# Patient Record
Sex: MALE | Race: BLACK OR AFRICAN AMERICAN | Employment: UNEMPLOYED | URBAN - METROPOLITAN AREA
[De-identification: names, ages, dates, MRNs, and addresses within clinical notes are randomized per-mention and may not be internally consistent; named-entity substitution may affect disease eponyms.]

---

## 2019-05-09 ENCOUNTER — HOSPITAL ENCOUNTER (EMERGENCY)
Age: 49
Discharge: HOME | End: 2019-05-09
Attending: EMERGENCY MEDICINE | Admitting: EMERGENCY MEDICINE
Payer: MEDICAID

## 2019-05-09 VITALS
TEMPERATURE: 98.2 F | RESPIRATION RATE: 16 BRPM | DIASTOLIC BLOOD PRESSURE: 91 MMHG | SYSTOLIC BLOOD PRESSURE: 129 MMHG | HEART RATE: 72 BPM | OXYGEN SATURATION: 100 %

## 2019-05-09 DIAGNOSIS — G89.29 CHRONIC RIGHT-SIDED LOW BACK PAIN WITH RIGHT-SIDED SCIATICA: Primary | ICD-10-CM

## 2019-05-09 DIAGNOSIS — M54.41 CHRONIC RIGHT-SIDED LOW BACK PAIN WITH RIGHT-SIDED SCIATICA: Primary | ICD-10-CM

## 2019-05-09 PROCEDURE — 99283 EMERGENCY DEPT VISIT LOW MDM: CPT

## 2019-05-09 PROCEDURE — 96372 THER/PROPH/DIAG INJ SC/IM: CPT

## 2019-05-09 PROCEDURE — 74011250636 HC RX REV CODE- 250/636: Performed by: PHYSICIAN ASSISTANT

## 2019-05-09 RX ORDER — KETOROLAC TROMETHAMINE 10 MG/1
10 TABLET, FILM COATED ORAL
Qty: 20 TAB | Refills: 0 | Status: SHIPPED | OUTPATIENT
Start: 2019-05-09 | End: 2019-05-14

## 2019-05-09 RX ORDER — KETOROLAC TROMETHAMINE 30 MG/ML
15 INJECTION, SOLUTION INTRAMUSCULAR; INTRAVENOUS
Status: COMPLETED | OUTPATIENT
Start: 2019-05-09 | End: 2019-05-09

## 2019-05-09 RX ADMIN — KETOROLAC TROMETHAMINE 15 MG: 30 INJECTION, SOLUTION INTRAMUSCULAR at 11:59

## 2019-05-09 NOTE — ED NOTES
I have reviewed discharge instruction and prescriptions with patient. Patient verbalized understanding and has no further questions at this time. Education taught and patient verbalized understanding of education. Teach back method used. Patients pain decreased at time of discharge. Belongings given to patient. Patient discharged ambulatory to home.

## 2019-05-09 NOTE — ED NOTES
Patient states he is unable to care for himself since having a stroke \"awhile ago\". Patient states he is wearing the same clothing for the last 4 days due to inability to dress/ undress himself or bath himself. Patient also states that he does not have food in his house and is also unable to cook for himself. Patient states he did have in home care by a nurse but no longer. Case management consulted.

## 2019-05-09 NOTE — PROGRESS NOTES
Case Management consult received for \"concerns about home care. \" Call was placed to pt's [facility] , Ricardo Buckner ([phone]) and confirmed that pt is followed by a Dr. Dayton Hendrix, psychiatrist with the [facility]. Marcus Norton confirmed that pt also has a mental health counselor, home health, a personal care aide to assist with ADL's and a  to assist with IADL's. Per Marcus Norton, the [address] with the patient weekly and meals are prepared by pt's personal care aide. Marcus Norton also informed me that pt is \"sometimes difficult to keep up with\" as he is often not home when home health and his aide arrives. Marcus Norton endorsed just seeing patient on Monday, 5/6/19 and stated that he was fine. He informed me that pt's partner is currently hospitalized at [facility] on the behavioral health unit, and informed me that pt has been \"exhibiting attention seeking behavior\" since his partner's hospitalization. Will update pt's provider and nurse. Saen Guallpa RN, BSN, ACM Outcomes Manager 
[phone] (phone)

## 2019-05-09 NOTE — DISCHARGE INSTRUCTIONS
Patient Education        Back Pain: Care Instructions  Your Care Instructions    Back pain has many possible causes. It is often related to problems with muscles and ligaments of the back. It may also be related to problems with the nerves, discs, or bones of the back. Moving, lifting, standing, sitting, or sleeping in an awkward way can strain the back. Sometimes you don't notice the injury until later. Arthritis is another common cause of back pain. Although it may hurt a lot, back pain usually improves on its own within several weeks. Most people recover in 12 weeks or less. Using good home treatment and being careful not to stress your back can help you feel better sooner. Follow-up care is a key part of your treatment and safety. Be sure to make and go to all appointments, and call your doctor if you are having problems. It's also a good idea to know your test results and keep a list of the medicines you take. How can you care for yourself at home? · Sit or lie in positions that are most comfortable and reduce your pain. Try one of these positions when you lie down:  ? Lie on your back with your knees bent and supported by large pillows. ? Lie on the floor with your legs on the seat of a sofa or chair. ? Lie on your side with your knees and hips bent and a pillow between your legs. ? Lie on your stomach if it does not make pain worse. · Do not sit up in bed, and avoid soft couches and twisted positions. Bed rest can help relieve pain at first, but it delays healing. Avoid bed rest after the first day of back pain. · Change positions every 30 minutes. If you must sit for long periods of time, take breaks from sitting. Get up and walk around, or lie in a comfortable position. · Try using a heating pad on a low or medium setting for 15 to 20 minutes every 2 or 3 hours. Try a warm shower in place of one session with the heating pad. · You can also try an ice pack for 10 to 15 minutes every 2 to 3 hours. Put a thin cloth between the ice pack and your skin. · Take pain medicines exactly as directed. ? If the doctor gave you a prescription medicine for pain, take it as prescribed. ? If you are not taking a prescription pain medicine, ask your doctor if you can take an over-the-counter medicine. · Take short walks several times a day. You can start with 5 to 10 minutes, 3 or 4 times a day, and work up to longer walks. Walk on level surfaces and avoid hills and stairs until your back is better. · Return to work and other activities as soon as you can. Continued rest without activity is usually not good for your back. · To prevent future back pain, do exercises to stretch and strengthen your back and stomach. Learn how to use good posture, safe lifting techniques, and proper body mechanics. When should you call for help? Call your doctor now or seek immediate medical care if:    · You have new or worsening numbness in your legs.     · You have new or worsening weakness in your legs. (This could make it hard to stand up.)     · You lose control of your bladder or bowels.    Watch closely for changes in your health, and be sure to contact your doctor if:    · You have a fever, lose weight, or don't feel well.     · You do not get better as expected. Where can you learn more? Go to http://timbo-mason.info/. Enter X416 in the search box to learn more about \"Back Pain: Care Instructions. \"  Current as of: September 20, 2018  Content Version: 11.9  © 1139-2453 [employer]. Care instructions adapted under license by [employer] (which disclaims liability or warranty for this information). If you have questions about a medical condition or this instruction, always ask your healthcare professional. [place] [address] any warranty or liability for your use of this information.          Patient Education        Learning About Relief for Back Pain  What is back tension and strain? Back strain happens when you overstretch, or pull, a muscle in your back. You may hurt your back in an accident or when you exercise or lift something. Most back pain will get better with rest and time. You can take care of yourself at home to help your back heal.  What can you do first to relieve back pain? When you first feel back pain, try these steps:  · Walk. Take a short walk (10 to 20 minutes) on a level surface (no slopes, hills, or stairs) every 2 to 3 hours. Walk only distances you can manage without pain, especially leg pain. · Relax. Find a comfortable position for rest. Some people are comfortable on the floor or a medium-firm bed with a small pillow under their head and another under their knees. Some people prefer to lie on their side with a pillow between their knees. Don't stay in one position for too long. · Try heat or ice. Try using a heating pad on a low or medium setting, or take a warm shower, for 15 to 20 minutes every 2 to 3 hours. Or you can buy single-use heat wraps that last up to 8 hours. You can also try an ice pack for 10 to 15 minutes every 2 to 3 hours. You can use an ice pack or a bag of frozen vegetables wrapped in a thin towel. There is not strong evidence that either heat or ice will help, but you can try them to see if they help. You may also want to try switching between heat and cold. · Take pain medicine exactly as directed. ¨ If the doctor gave you a prescription medicine for pain, take it as prescribed. ¨ If you are not taking a prescription pain medicine, ask your doctor if you can take an over-the-counter medicine. What else can you do? · Stretch and exercise. Exercises that increase flexibility may relieve your pain and make it easier for your muscles to keep your spine in a good, neutral position. And don't forget to keep walking. · Do self-massage.  You can use self-massage to unwind after work or school or to energize yourself in the morning. You can easily massage your feet, hands, or neck. Self-massage works best if you are in comfortable clothes and are sitting or lying in a comfortable position. Use oil or lotion to massage bare skin. · Reduce stress. Back pain can lead to a vicious [place]: Distress about the pain tenses the muscles in your back, which in turn causes more pain. Learn how to relax your mind and your muscles to lower your stress. Where can you learn more? Go to http://timbo-mason.info/. Enter I490 in the search box to learn more about \"Learning About Relief for Back Pain. \"  Current as of: March 21, 2017  Content Version: 11.5  © 9692-2387 [employer]. Care instructions adapted under license by [employer] (which disclaims liability or warranty for this information). If you have questions about a medical condition or this instruction, always ask your healthcare professional. [place] [address] any warranty or liability for your use of this information. Patient Education        Chronic Pain: Care Instructions  Your Care Instructions    Chronic pain is pain that lasts a long time (months or even years) and may or may not have a clear cause. It is different from acute pain, which usually does have a clear cause--like an injury or illness--and gets better over time. Chronic pain:  · Lasts over time but may vary from day to day. · Does not go away despite efforts to end it. · May disrupt your sleep and lead to fatigue. · May cause depression or anxiety. · May make your muscles tense, causing more pain. · Can disrupt your work, hobbies, home life, and relationships with friends and family. Chronic pain is a very real condition. It is not just in your head. Treatment can help and usually includes several methods used together, such as medicines, physical therapy, exercise, and other treatments.  Learning how to relax and changing negative thought patterns can also help you cope. Chronic pain is complex. Taking an active role in your treatment will help you better manage your pain. Tell your doctor if you have trouble dealing with your pain. You may have to try several things before you find what works best for you. Follow-up care is a key part of your treatment and safety. Be sure to make and go to all appointments, and call your doctor if you are having problems. It's also a good idea to know your test results and keep a list of the medicines you take. How can you care for yourself at home? · Pace yourself. Break up large jobs into smaller tasks. Save harder tasks for days when you have less pain, or go back and forth between hard tasks and easier ones. Take rest breaks. · Relax, and reduce stress. Relaxation techniques such as deep breathing or meditation can help. · Keep moving. Gentle, daily exercise can help reduce pain over the long run. Try low- or no-impact exercises such as walking, swimming, and stationary biking. Do stretches to stay flexible. · Try heat, cold packs, and massage. · Get enough sleep. Chronic pain can make you tired and drain your energy. Talk with your doctor if you have trouble sleeping because of pain. · Think positive. Your thoughts can affect your pain level. Do things that you enjoy to distract yourself when you have pain instead of focusing on the pain. See a movie, read a book, listen to music, or spend time with a friend. · If you think you are depressed, talk to your doctor about treatment. · Keep a daily pain diary. Record how your moods, thoughts, sleep patterns, activities, and medicine affect your pain. You may find that your pain is worse during or after certain activities or when you are feeling a certain emotion. Having a record of your pain can help you and your doctor find the best ways to treat your pain. · Take pain medicines exactly as directed.   ? If the doctor gave you a prescription medicine for pain, take it as prescribed. ? If you are not taking a prescription pain medicine, ask your doctor if you can take an over-the-counter medicine. Reducing constipation caused by pain medicine  · Include fruits, vegetables, beans, and whole grains in your diet each day. These foods are high in fiber. · Drink plenty of fluids, enough so that your urine is light yellow or clear like water. If you have kidney, heart, or liver disease and have to limit fluids, talk with your doctor before you increase the amount of fluids you drink. · If your doctor recommends it, get more exercise. Walking is a good choice. Bit by bit, increase the amount you walk every day. Try for at least 30 minutes on most days of the week. · Schedule time each day for a bowel movement. A daily routine may help. Take your time and do not strain when having a bowel movement. When should you call for help? Call your doctor now or seek immediate medical care if:    · Your pain gets worse or is out of control.     · You feel down or blue, or you do not enjoy things like you once did. You may be depressed, which is common in people with chronic pain. Depression can be treated.     · You have vomiting or cramps for more than 2 hours.    Watch closely for changes in your health, and be sure to contact your doctor if:    · You cannot sleep because of pain.     · You are very worried or anxious about your pain.     · You have trouble taking your pain medicine.     · You have any concerns about your pain medicine.     · You have trouble with bowel movements, such as:  ? No bowel movement in 3 days. ? Blood in the anal area, in your stool, or on the toilet paper. ? Diarrhea for more than 24 hours. Where can you learn more? Go to http://timbo-mason.info/. Enter N004 in the search box to learn more about \"Chronic Pain: Care Instructions. \"  Current as of: Cristy 3, 2018  Content Version: 11.9  © 6127-1901 [employer], [facility].  Care instructions adapted under license by [employer] (which disclaims liability or warranty for this information). If you have questions about a medical condition or this instruction, always ask your healthcare professional. [address] any warranty or liability for your use of this information.

## 2019-05-09 NOTE — ED PROVIDER NOTES
EMERGENCY DEPARTMENT HISTORY AND PHYSICAL EXAM 
 
Date: 5/9/2019 Patient Name: Deon Melton History of Presenting Illness Chief Complaint Patient presents with  Back Pain History Provided By: Patient Additional History (Context): Deon Melton is a 51 y.o. male with h/o chronic back pain, HIV, seizure, depression, substance abuse, stroke, heart failure who presents with chronic back pain. Patient states he has had this pain for years and years, gets occasional flareups but is usually seen by [facility] provider. States pain is located in right lower back sometimes shoots down right leg. Denies fever, chills. Denies saddle anesthesia or incontinence. Patient states he lives alone but does not have any care of him. Patient denies any weakness, numbness or tingling or any other concerning symptoms at this time. Patient states pain today is similar to all previous flareups and just needs something to help with the pain. PCP: None Current Outpatient Medications Medication Sig Dispense Refill  ketorolac (TORADOL) 10 mg tablet Take 1 Tab by mouth every six (6) hours as needed for Pain for up to 5 days. 20 Tab 0  
 levETIRAcetam (KEPPRA) 500 mg tablet Take  by mouth two (2) times a day.  lisinopril (PRINIVIL, ZESTRIL) 10 mg tablet Take  by mouth daily.  emtricitabine-tenofovir (TRUVADA) 200-300 mg per tablet Take  by mouth daily.  ritonavir (NORVIR) 100 mg tablet Take  by mouth two (2) times daily (with meals). Past History Past Medical History: 
Past Medical History:  
Diagnosis Date  Autoimmune disease ([address]  Depression  Heart failure ([address]  HIV (human immunodeficiency virus infection) ([facility]  HTN (hypertension)  Seizure ([address]  Seizures ([address]  Stroke ([facility] [address]  Substance abuse Past Surgical History: No past surgical history on file. Family History: 
Family History Problem Relation Age of Onset  Depression Neg Hx  Suicide Neg Hx  Psychotic Disorder Neg Hx  Substance Abuse Neg Hx  Dementia Neg Hx Social History: 
Social History Tobacco Use  Smoking status: Current Every Day Smoker Packs/day: 0.25 Substance Use Topics  Alcohol use: Yes Alcohol/week: 0.5 oz Types: 1 Cans of beer per week  Drug use: Yes Types: Marijuana, Cocaine Allergies: Allergies Allergen Reactions  Penicillins Swelling  Prozac [Fluoxetine] Swelling Review of Systems Review of Systems Constitutional: Negative for chills and fever. HENT: Negative. Eyes: Negative. Respiratory: Negative for cough and shortness of breath. Cardiovascular: Negative for chest pain and palpitations. Gastrointestinal: Negative for abdominal pain, constipation, diarrhea, nausea and vomiting. Genitourinary: Negative. Negative for difficulty urinating and flank pain. Musculoskeletal: Positive for back pain. Negative for gait problem and neck pain. Skin: Negative. Allergic/Immunologic: Negative. Neurological: Negative for dizziness, weakness, numbness and headaches. Psychiatric/Behavioral: Negative. All other systems reviewed and are negative. All Other Systems Negative Physical Exam  
Nursing note and vitals reviewed. CONSTITUTIONAL: Alert, in no apparent distress; well-developed; well-nourished. HEAD:  Normocephalic, atraumatic. RESP: Chest clear, equal breath sounds. CV: S1 and S2 WNL; No murmurs, gallops or rubs. GI: Abdomen soft and non-tender. No masses or organomegaly. BACK: right sided lower back  Muscles in lower back TTP. FROM, 5/5 strength, 2+DTR's,no lumbar paraspinal tenderness no erythema, no edema, no ecchymosis,(-) deformity, swelling, skin changes, midline tenderness or crepitus. (-) step offs. Neg SLR bilaterlly. Full sensation to deep and light palpation bilaterally. (-) foot drop UPPER EXT:  Normal inspection. LOWER EXT: Normal inspection NEURO:  strength 5/5 and sym, sensation intact. SKIN: No rashes; Normal for age and stage. PSYCH:  Alert and oriented, normal affect. Diagnostic Study Results Labs - No results found for this or any previous visit (from the past 12 hour(s)). Radiologic Studies - No orders to display CT Results  (Last 48 hours) None CXR Results  (Last 48 hours) None Medical Decision Making I am the first provider for this patient. I reviewed the vital signs, available nursing notes, past medical history, past surgical history, family history and social history. Vital Signs-Reviewed the patient's vital signs. Pulse Oximetry Analysis - 100% on RA Records Reviewed: Nursing Notes and Old Medical Records Procedures: 
Procedures Provider Notes (Medical Decision Making):  
52 yo M here with lower back pain. Pt presents ambulatory, moving BLE in NAD, well-hydrated, non-toxic in appearance, with normal vitals. Exam including neuro exam normal. No red flags such as saddle paresthesias, weakness, bladder/bowel dysfunction, IVDA, DM, or fever - very low suspicion for epidural abscess, cauda equina, or spinal cord injury. No recent injury/trauma/heavy lifting. Do not feel that any emergent imaging is warranted at this time. Exam and HPI consistent with lumbosacral radiculopathy/sciatica/myofascial strain/sprain. Assistant with patient's chronic pain. Patient had explained to nurse that he had problems at home caring for himself as the pain prevents him from being able to dress or make food. Case management was consulted. She was able to discuss case with his personal  from [place] who disclosed that patient actually has caretaker, physical therapist and assistant who takes patient weekly to get medications . Patient is not alone at home and when asked he agreed to this. Appropriate for discharge home. Progress Note/Consultations: MED RECONCILIATION: 
No current facility-administered medications for this encounter. Current Outpatient Medications Medication Sig  
 ketorolac (TORADOL) 10 mg tablet Take 1 Tab by mouth every six (6) hours as needed for Pain for up to 5 days.  levETIRAcetam (KEPPRA) 500 mg tablet Take  by mouth two (2) times a day.  lisinopril (PRINIVIL, ZESTRIL) 10 mg tablet Take  by mouth daily.  emtricitabine-tenofovir (TRUVADA) 200-300 mg per tablet Take  by mouth daily.  ritonavir (NORVIR) 100 mg tablet Take  by mouth two (2) times daily (with meals). Disposition: 
home DISCHARGE NOTE:  
 
Pt has been reexamined. Patient has no new complaints, changes, or physical findings. Care plan outlined and precautions discussed. Results of work up, treatment plan and disposition were reviewed with the patient. All medications were reviewed with the patient; including any prescriptions given. All of pt's questions and concerns were addressed. Patient was instructed and agrees to follow up with pcp , as well as to return to the ED upon further deterioration. Patient is ready to go home. Follow-up Information Follow up With Specialties Details Why Contact Info [facility] EMERGENCY DEPT Emergency Medicine   [address] 
[phone] [facility]    [address] [place] [address] [address] [place] [address]) [phone] Current Discharge Medication List  
  
START taking these medications Details  
ketorolac (TORADOL) 10 mg tablet Take 1 Tab by mouth every six (6) hours as needed for Pain for up to 5 days. Qty: 20 Tab, Refills: 0 Diagnosis Clinical Impression: back pain, muscle strain

## 2022-04-06 ENCOUNTER — HOSPITAL ENCOUNTER (OUTPATIENT)
Dept: PHYSICAL THERAPY | Age: 52
Discharge: HOME | End: 2022-04-06
Payer: COMMERCIAL

## 2022-04-06 PROCEDURE — 97162 PT EVAL MOD COMPLEX 30 MIN: CPT

## 2022-04-06 PROCEDURE — 97166 OT EVAL MOD COMPLEX 45 MIN: CPT

## 2022-04-06 NOTE — PROGRESS NOTES
[address] [facility] PHYSICAL THERAPY  [address] [facility], [facility], [address] [employer] - Phone: [phone]  Fax: [phone] / [facility]  Patient Name: Vickie Martinez : 1970   Medical   Diagnosis: Gait instability [R26.81]  CVA (cerebral vascular accident) [facility]) [I63.9] Treatment Diagnosis: Gait instability [R26.81]  CVA (cerebral vascular accident) [facility]) [I63.9]      Onset Date: 2019     Referral Source: Rosalva Cunningham MD [facility]): 2022   Prior Hospitalization: See medical history Provider #: [id number]   Prior Level of Function: Independent with ADLs, ambulation prior to CVA   Comorbidities: prior CVA, HTN, HIV, substance abuse, seizures, visual impairment, depression   Medications: Verified on Patient Summary List   The Plan of Care and following information is based on the information from the initial evaluation.   ===========================================================================================  Assessment / key information:  Patient is a 45 y.o. male who presents with complaints of left-sided weakness s/p CVA. Patient demonstrates impaired posture, decreased ROM/strength left UE/LE, decreased balance, decreased activity tolerance and impaired functional mobility/gait. Five Time Sit to Stand Test = 18\", Alexander = 36/56, gait speed as measured by 10-meter walk test = 0.4 m/s (household ambulation) and Functional Gait Assessment = 3/30 (all indicative of increased risk of fall). Patient ambulates with SPC with decreased gait speed and decreased left foot clearance/circumduction to advance left LE. Patient would benefit from skilled PT services to address these issues and improve function.   Thank you for this referral.    FOTO: 72/100  ==========================================================================================  Eval Complexity: History: HIGH Complexity :3+ comorbidities / personal factors will impact the outcome/ POC Exam:HIGH Complexity : 4+ Standardized tests and measures addressing body structure, function, activity limitation and / or participation in recreation  Presentation: MEDIUM Complexity : Evolving with changing characteristics  Clinical Decision Making:MEDIUM Complexity : FOTO score of 26-74Overall Complexity:MEDIUM      Problem List: pain affecting function, decrease ROM, decrease strength, impaired gait/ balance, decrease ADL/ functional abilitiies, decrease activity tolerance, decrease flexibility/ joint mobility and decrease transfer abilities   Treatment Plan may include any combination of the following: Therapeutic exercise, Therapeutic activities, Neuromuscular re-education, Physical agent/modality, Gait/balance training, Manual therapy, Patient education, Self Care training, Functional mobility training, Home safety training and Stair training  Patient / Family readiness to learn indicated by: asking questions, trying to perform skills and interest  Persons(s) to be included in education: patient (P)  Barriers to Learning/Limitations: None  Measures taken:    Patient Goal (s): \"Regain use of my left side. \"   Patient self reported health status: fair  Rehabilitation Potential: good   Short Term Goals: To be accomplished in  2-4  weeks:  1. Patient will demonstrate compliance with HEP. 2. Patient will score greater than or equal to 6/30 on FGA to indicate improved balance/decreased fall risk. 3. Patient will score greater than or equal to 39/56 on Alexander to indicate improved balance/decreased fall risk.  Long Term Goals: To be accomplished in  4-8  weeks:  1. Patient will demonstrate independence with HEP. 2. Patient will score greater than or equal to 9/30 on FGA to indicate improved balance/decreased fall risk. 3. Patient will score greater than or equal to 42/56 on Alexander to indicate improved balance/decreased fall risk.   Frequency / Duration: Patient to be seen  2  times per week for 4-8  weeks:  Patient / Caregiver education and instruction: activity modification    Therapist Signature: Chata Ireland, TORRIE Date: 4/8/5671   Certification Period: NA Time: 2:05 PM   ===========================================================================================  I certify that the above Physical Therapy Services are being furnished while the patient is under my care. I agree with the treatment plan and certify that this therapy is necessary. Physician Signature:        Date:       Time:     Please sign and return to [facility] or you may fax the signed copy to [phone]. Thank you.

## 2022-04-06 NOTE — PROGRESS NOTES
[address] [facility] PHYSICAL THERAPY  [address] [place] [address] [address], [place], [address] - Phone: [phone]  Fax: [phone] / [phone]  [facility] THERAPY SERVICES  Patient Name: Patricia Carbajal : 1970   Medical   Diagnosis: Generalized muscle weakness [M62.81]  CVA (cerebral vascular accident) [facility]) [I63.9] Treatment Diagnosis: Left upper extremity weakness, decreased coordination   Onset Date: 2019     Referral Source: Sedrick Dunlap MD [facility]): 2022   Prior Hospitalization: See medical history Provider #: [id number]   Prior Level of Function: ADLs with assistance   Comorbidities: HIV/AIDS, depression, alcohol use, high blood pressure, tobacco use, CVA   Medications: Verified on Patient Summary List   The Plan of Care and following information is based on the information from the initial evaluation.   ===========================================================================================  Assessment / key information:  Patient is a right hand dominant 45 y.o. male with a chief complaint  of weakness in LUE. Pt. Does not experience any pain in LUE at rest. Pt. Has a history of CVA in  and , however never received skilled OT services. Pt. Has a caregiver, Joshua Novoa, who lives with patient and provides day to day assistance in all ADLs. Objective measurements reveal Limited AROM and PROM in LUE. Pt. Has active elbow ROM . PROM of shoulder, elbow, wrist, and digits severely limited due to tone. Pt's L wrist at rest is at 40 degrees flexion, as well as digits in a tight fist. Therapist able to open hand partially, however caregiver and pt. Are unable to clean hand due to difficulty opening hand. Pt. Has limitations in all ADLs, however has an aid to assist.  Pt. Would benefit from a resting hand orthosis/carrot to reduce digit contracture and allow for cleaning of hand. Pt.  Would benefit from skilled OT services to increase ROM of LUE, reduce hand contracture, increase L hand hygiene, form HEP for LUE joint health, increase safety awareness of LUE. FOTO score: NT this date  ===========================================================================================  Eval Complexity: History: MEDIUM Complexity : Expanded review of history including physical, cognitive and psychosocial  history ; Examination: MEDIUM Complexity : 3-5 performance deficits relating to physical, cognitive , or psychosocial skils that result in activity limitations and / or participation restrictions; Decision Making:MEDIUM Complexity : Patient may present with comorbidities that affect occupational performnce. Miniml to moderate modification of tasks or assistance (eg, physical or verbal ) with assesment(s) is necessary to enable patient to complete evaluation   Problem List: Decreased range of motion, Decreased strength, Decreased coordination/prehension, Decreased ADL/functional abilities , Decreased flexibility/joint mobility and Sensability   Treatment Plan may include any combination of the following: Therapeutic exercise, Therapeutic activities, Physical agent/modality, Manual therapy, Splinting/orthoses, Patient education and ADLs/IADLs  Patient / Family readiness to learn indicated by: asking questions, trying to perform skills and interest  Persons(s) to be included in education: patient (P) and family support person (FSP);list gordon (caregiver)  Barriers to Learning/Limitations: none  Measures taken: Observation: Pt. Observed seated with asymmetry leaning to R side, with LUE in flexed resting position.      Palpation:  No tenderness upon palpation of digits, wrist, elbow or shoulder.  Hypertonicity present throughout all LUE joints.     Range of Motion:      UE Range of Motion:                 Active Passive       Norms Right Left Right Left   Shoulder Flex 0-180       0-95     Ext 0-60             abd 0-180       0-82     Horizontal add 0-45       0-100     IR 0-70       wnl     ER 0-90       0-18   Elbow Ext/flex 0-150          Forearm Supination 0-80       0-70     Pronation 0-80       35-80   Wrist Flex 0-80       0-40, fixed position     Ext 0-70       0-23     Ulnar Dev 0-30             Radial Dev 0-20              Strength:  L elbow 2/10  Sensation:      Limited awareness of L hand/arm; unable to distinguish light touch, sharp/dull touch, temperature  Edema: None present  Special Tests:   Nine-Hole Peg Test: Unable to test at this time d/t L hand contracture  ADLs  Feeding:                []?MaxA   []? [address] [facility]   [x]? Meghann   []? CGA   []? SBA   []? Marla   []? Independent  UE Dressing:         []? MaxA   [x]? [address] [facility] [place]   []? Meghann   []? CGA   []? SBA   []? Marla   []? Independent  LE Dressing:         []? MaxA   []? [address] [facility] [place]   [x]? Meghann   []? CGA   []? SBA   []? Marla   []? Independent  Grooming:             []?MaxA   []? [address] [facility] [place]   []? Meghann   []? CGA   []? SBA   [x]? Marla   []? Independent  Toileting:               []?MaxA   [x]? [address] [facility] [place]   []? Meghann   []? CGA   []? SBA   []? Marla   []? Independent  Bathing:                 []?MaxA   [x]? [address] [facility] [place]   []? Meghann   []? CGA   []? SBA   []? Marla   []? Independent *MD ordered grab bars for bathroom. Pt. Unable to clean L hand due to tone. Light Meal Prep:    [x]? MaxA   []? [address] [facility] [place]   []? Meghann   []? CGA   []? SBA   []? Marla   []? Independent  Household/Other: [x]? MaxA   []? [address] [facility] [place]   []? Meghann   []? CGA   []? SBA   []? Marla   []? Independent  Adaptive Equip:     []? MaxA   []? [address] [facility] [address]   []? Meghann   []? CGA   []? SBA   []? Marla   []? Independent  Driving:                  []?MaxA   []? [address] [facility] [place]   []? Meghann   []? CGA   []? SBA   []? Marla   []? Independent NO driving     Patient Goal (s): Regain use of my left side   Patient self reported health status: fair  Rehabilitation Potential: good   Short Term Goals: To be accomplished in 2 weeks:  1. Pt. Manuela Sommer will be I in HEP to increase joint health.     2.  Pt. Will be fitted with orthosis in order to reduce hand contracture and allow for hand hygiene.        Long Term Goals: To be accomplished in 4  weeks:  3. Pt./caregiver will demonstrate proper hand hygiene (washing and full drying) in order to reduce infection risk of hand.      4. Pt. Will increase safety awareness of LUE in daily tasks in order to reduce injury risk due to decreased sensation/awaress of arm. Frequency / Duration:   Patient to be seen 1-2  times per week for 4  weeks:  Patient / Caregiver education and instruction: self care    Therapist Signature: Beverly Brock OT Date: 7/6/8800   Certification Period:  4/6/2022 - 5/4/2022 Time: 2:24 PM   ===========================================================================================  I certify that the above Occupational Therapy Services are being furnished while the patient is under my care. I agree with the treatment plan and certify that this therapy is necessary. Physician Signature:        Date:       Time:     Please sign and return to [facility] or you may fax the signed copy to [phone]. Thank you.                                        Alexandre Deshpande MD

## 2022-04-06 NOTE — PROGRESS NOTES
PHYSICAL THERAPY - DAILY TREATMENT NOTE    Patient Name: Alea Tamayo        Date: 2022  : 1970   YES Patient  Verified  Visit #:   1     Insurance: Payor: [address] [place] [address] Road / Plan: [address] / Product Type: Managed Care Medicaid /      In time: 2:00 Out time: 2:49   Total Treatment Time: 52     Medicare/BCBS [employer] Time Tracking (below)   Total Timed Codes (min):  NA 1:1 Treatment Time:  NA     TREATMENT AREA =  CVA with left HP    SUBJECTIVE    Pain Level (on 0 to 10 scale):  0  / 10   Medication Changes/New allergies or changes in medical history, any new surgeries or procedures? NO    If yes, update Summary List   Subjective Functional Status/Changes:  []  No changes reported     Pt reports that he had 2 strokes and he's trying to gain mobility and strength in the left side of his body. Pt reports that he had a stroke in  and . Pt reports that he has not had any PT after either stroke. Pt reports that his balance is off sometimes, sometimes he falls. Pt reports that he uses SPC at all times. Pt reports that he has a quad cane, but it is too heavy. Pt reports that he lives in [facility] with 2 REDD with B HR. Pt's aide reports that someone is always at home with patient. Pt's aide reports that they are trying to get rails and shower chair for bathroom. Pt denies pain, numbness/tingling.        OBJECTIVE    Physical Therapy Evaluation  Neurologic    Posture: [] Poor    [x] Fair    [] Good    Describe: Protracted head/shoulders, increased thoracic kyphosis, decreased lumbar lordosis      Gait: [] Normal    [x] Abnormal    Device: SPC     Describe: Decreased gait speed, decreased left foot clearance    ROM:                             AROM    PROM   Knee Left Right Left Right   Ext Dec [facility]     Flex Dec [facility]               AROM                           PROM  Hip Left Right Left Right   Flex [facility]     Ext Dec [facility]     ABD Dec [facility]     ER Dec [facility] IR Dec [facility]                                              AROM      PROM   Ankle Left Right Left Right   Ext Dec [facility]     Flex Dec [facility] [place]       Strength (MMT):      Hip L (1-5) R (1-5)   Hip Flexion 3 4   Hip Ext 3- 4   Hip ABD 3- 4   Hip ADD 3- 4   Hip ER 3- 4   Hip IR 3- 4     Knee L (1-5) R (1-5)   Knee Flexion 2- 5   Knee Extension 2- 5   Ankle PF 2- 5   Ankle DF 2- 5   Other       Tone: Impaired left UE/LE    Motor Control: Impaired left UE/LE    Sensation: NT    Reflexes: [x] Not Tested   Left Right   Biceps (C5)     Brachioradiais (C6)     Triceps (C7)     Knee Jerk (L4)     Ankle Jerk (SI)       Balance/ Equilibrium:              Left            Right  Tracks Across Midline YES  YES   Reaches Across Midline NO YES         Sitting Balance: Static:  [] Good    [x] Fair    [] Poor     Dynamic:   [] Good    [x] Fair    [] Poor        Standing Balance: Static:   [] Good    [] Fair    [x] Poor     Dynamic:   [] Good    [] Fair    [x] Poor        Protective Extension:  [x] Present    [] Delayed    [] Absent        Single Leg Stance:  NT       Eyes Open  Eyes Closed   L  L    R  R        Functional Mobility      Bed Mobility:      Scooting: NT       Rolling: NT       Sit-Supine: NT       Transfers:       Sit-Stand: mod I, FTSST = 18\"       Floor-Stand: NT      Gait:       Tandem: FGA       Backwards: FGA       Braiding: NT      Elevations:       Curbs: NT       Ramps: NT       Stairs: FGA    Behavior: [x] Cooperative    [] Impulsive    [] Agitated    [] Perseverative    [] Confused   Oriented x: 4    Cognition: [x] One Step Commands   [] Multiple Commands   [] Displays Neglect [] R  [x] L    Other:       Impaired Judgement: [] Y    [x] N      Impaired Vision:  [x] Y    [] N      Safety Awareness Deficits  [x] Y    [] N      Impaired Hearing  [] Y    [x] N      Able to Express Needs [x] Y    [] N    Optional Tests:       Dynamic Gait Index (24pt scale):        Functional Gait Assessment (30pt scale): 3/30       [place] Balance Scale (56pt scale):     Other test /comments:  10-meter walk test = 15\" (0.4 m/s, household ambulation)     During eval min Patient Education:  NO  Reviewed HEP   []  Progressed/Changed HEP based on:   Discussed POC     Other Objective/Functional Measures:    See eval         Post Treatment Pain Level (on 0 to 10) scale:   0  / 10     ASSESSMENT    Assessment/Changes in Function:     See plan of care    Justification for Eval Code Complexity:  Patient History : HIGH - prior CVA, HTN, HIV, substance abuse, seizures, visual impairment, depression  Examination HIGH - see objective  Clinical Presentation: MEDIUM  Clinical Decision Making : MEDIUM - FOTO 72/100       []  See Progress Note/Recertification   Patient will continue to benefit from skilled PT services: see plan of care   Progress toward goals / Updated goals:    See plan of care     PLAN    [x]  Upgrade activities as tolerated YES Continue plan of care   []  Discharge due to :    []  Other:      Therapist: Niya Galindo, PT    Date: 4/6/2022 Time: 2:05 PM

## 2022-04-06 NOTE — PROGRESS NOTES
OT DAILY TREATMENT NOTE/HAND EVAL     Patient Name: Zeus Liter  Date:2022  : 1970  [x]  Patient  Verified  Payor: [address] / Plan: [address] / Product Type: Managed Care Medicaid /    In time:1:15  Out time:2:00  Total Treatment Time (min): 45  Visit #: 1 of 8    Treatment Area: Generalized muscle weakness [M62.81]  CVA (cerebral vascular accident) ([facility] [address] [I63.9]  SUBJECTIVE  Pain Level (0-10 scale): 0/10  []constant []intermittent []improving []worsening []no change since onset    Pain Rating:   Current: (0-no pain 10-debilitating pain) severe   At best: (0-no pain 10-debilitating pain) severe  At worst: (0-no pain 10-debilitating pain) severe  Location: left LE  Type:  no   Better with: lying down  Worse with: No data      Any medication changes, allergies to medications, adverse drug reactions, diagnosis change, or new procedure performed?: [x] No    [] Yes (see summary sheet for update)  Subjective functional status/changes:     PLOF: Limited  Limitations to PLOF: Requires A in all ADLs  Mechanism of Injury: CVA  Current symptoms/Complaints: Increased tone in L UE  Previous Treatment/Compliance: No Previous OT  PMHx/Surgical Hx: HIV AIDS/ depression, alcohol use, high blood pressure, tobacco use, CVA  Work Hx: [employer]  Living Situation: Lives with aid, [facility]  Pt Goals: wants to use the L side of body more  Barriers: []pain []financial []time [x]transportation []other  Motivation: very motivated  Substance use: []Alcohol []Tobacco [x]other:   FABQ Score: []low []elevate  Cognition: A & O x 4    Other:    OBJECTIVE/EXAMINATION  Domestic Life:  substance abuse group;  Available friday  Activity/Recreational Limitations: unable to use LUE  Mobility: cane  Self Care: requires assistance    Precautions: HIV/AIDS, decreased awareness of LUE    Hand Dominance: right handed   Hand Involved: left    Social History: Group, chess, reading    Occupation/Job Requirements: NA    45 min []Eval                  []Re-Eval           With   [] TE   [] TA   [] neuro   [x] other:eval Patient Education: [x] Review HEP  ; reviewed POC  [] Progressed/Changed HEP based on:   [] positioning   [] body mechanics   [] transfers   [] heat/ice application    [] other:      Observation: Pt. Observed seated with asymmetry leaning to R side, with LUE in flexed resting position. Palpation:  No tenderness upon palpation of digits, wrist, elbow or shoulder. Hypertonicity present throughout all LUE joints. Range of Motion:     UE Range of Motion:     Active Passive     Norms Right Left Right Left   Shoulder Flex 0-180    0-95    Ext 0-60        abd 0-180    0-82    Horizontal add 0-45    0-100    IR 0-70    wnl    ER 0-90    0-18   Elbow Ext/flex 0-150       Forearm Supination 0-80    0-70    Pronation 0-80    35-80   Wrist Flex 0-80    0-40, fixed position    Ext 0-70    0-23    Ulnar Dev 0-30        Radial Dev 0-20         Strength:  L elbow 2/10  Sensation:      Limited awareness of L hand/arm; unable to distinguish light touch, sharp/dull touch, temperature  Edema: None present  Special Tests:   Nine-Hole Peg Test: Unable to test at this time d/t L hand contracture  ADLs  Feeding:        []MaxA   []ModA   [x]Meghann   [] CGA   []SBA   []Marla   []Independent  UE Dressing:       []MaxA   [x]ModA   []Meghann   [] CGA   []SBA   []Marla   []Independent  LE Dressing:       []MaxA   []ModA   [x]Meghann   [] CGA   []SBA   []Marla   []Independent  Grooming:       []MaxA   []ModA   []Meghann   [] CGA   []SBA   [x]Marla   []Independent  Toileting:       []MaxA   [x]ModA   []Meghann   [] CGA   []SBA   []Marla   []Independent  Bathing:       []MaxA   [x]ModA   []Meghann   [] CGA   []SBA   []Marla   []Independent *MD ordered grab bars for bathroom. Pt. Unable to clean L hand due to tone.   Light Meal Prep:    [x]MaxA   []ModA   []Meghann   [] CGA   []SBA   []Marla []Independent  Household/Other: [x]MaxA   []ModA   []Meghann   [] CGA   []SBA   []Marla   []Independent  Adaptive Equip:     []MaxA   []ModA   []Meghann   [] CGA   []SBA   []Marla   []Independent  Driving:       []MaxA   []ModA   []Meghann   [] CGA   []SBA   []Marla   []Independent NO driving    Pain Level (0-10 scale) post treatment: 0/10    ASSESSMENT/Changes in Function:     Patient is a right hand dominant 45 y.o. male with a chief complaint  of weakness in LUE. Pt. Does not experience any pain in LUE at rest. Pt. Has a history of CVA in 2004 and 2019, however never received skilled OT services. Pt. Has a caregiver, Haydee Taylor, who lives with patient and provides day to day assistance in all ADLs. Objective measurements reveal Limited AROM and PROM in LUE. Pt. Has active elbow ROM . PROM of shoulder, elbow, wrist, and digits severely limited due to tone. Pt's L wrist at rest is at 40 degrees flexion, as well as digits in a tight fist. Therapist able to open hand partially, however caregiver and pt. Are unable to clean hand due to difficulty opening hand. Pt. Has limitations in all ADLs, however has an aid to assist.  Pt. Would benefit from a resting hand orthosis/carrot to reduce digit contracture and allow for cleaning of hand. Pt. Would benefit from skilled OT services to increase ROM of LUE, reduce hand contracture, increase L hand hygiene, form HEP for LUE joint health, increase safety awareness of LUE. Patient will continue to benefit from skilled OT services to modify and progress therapeutic interventions, address ROM deficits, address strength deficits, analyze and address soft tissue restrictions, analyze and cue movement patterns, analyze and modify body mechanics/ergonomics and assess and modify postural abnormalities to attain remaining goals. []  See Plan of Care  []  See progress note/recertification  []  See Discharge Summary         Progress towards goals / Updated goals:  1.  Karolina Hough will be I in HEP to increase joint health. 2. Pt. Will be fitted with orthosis in order to reduce hand contracture and allow for hand hygiene. 3. Pt./caregiver will demonstrate proper hand hygiene (washing and full drying) in order to reduce infection risk of hand. 4. Pt. Will increase safety awareness of LUE in daily tasks in order to reduce injury risk due to decreased sensation/awaress of arm.      PLAN  []  Upgrade activities as tolerated     [x]  Continue plan of care  []  Update interventions per flow sheet       []  Discharge due to:_  []  Other:_      Soo Barger OT 4/6/2022  1:31 PM

## 2022-04-15 ENCOUNTER — HOSPITAL ENCOUNTER (OUTPATIENT)
Dept: PHYSICAL THERAPY | Age: 52
Discharge: HOME | End: 2022-04-15
Payer: COMMERCIAL

## 2022-04-15 PROCEDURE — 97530 THERAPEUTIC ACTIVITIES: CPT

## 2022-04-15 PROCEDURE — 97535 SELF CARE MNGMENT TRAINING: CPT

## 2022-04-15 PROCEDURE — 97112 NEUROMUSCULAR REEDUCATION: CPT

## 2022-04-15 PROCEDURE — 97110 THERAPEUTIC EXERCISES: CPT

## 2022-04-15 PROCEDURE — 97760 ORTHOTIC MGMT&TRAING 1ST ENC: CPT

## 2022-04-15 NOTE — PROGRESS NOTES
PHYSICAL THERAPY - DAILY TREATMENT NOTE    Patient Name: Charlie Dominguez        Date: 4/15/2022  : 1970   YES Patient  Verified  Visit #:   2     Insurance: Payor: [address] / Plan: [address] / Product Type: Managed Care Medicaid /      In time: 11:45 Out time: 12:25   Total Treatment Time: 40     Medicare/BCBS [place] Time Tracking (below)   Total Timed Codes (min):  na 1:1 Treatment Time:  na     TREATMENT AREA =  Gait instability [R26.81]  CVA (cerebral vascular accident) ([facility] [address] [I63.9]    SUBJECTIVE    Pain Level (on 0 to 10 scale):  0  / 10   Medication Changes/New allergies or changes in medical history, any new surgeries or procedures? NO    If yes, update Summary List   Subjective Functional Status/Changes:  []  No changes reported     Pt reports no questions after initial evaluation. \"People say I need to exercise more. \"       OBJECTIVE    10 min Therapeutic Exercise:  [x]  See flow sheet   Rationale:    increase ROM, increase strength, improve coordination, improve balance and increase proprioception to promote increased functional mobility and increased activity tolerance with ADL's. 15 min Therapeutic Activity: see flow sheet    Rationale:    increase ROM, increase strength, improve coordination, improve balance and increase proprioception to promote increased functional mobility and increased activity tolerance with ADL's. 15 min Neuromuscular Re-ed: see flow sheet    Rationale:     improve coordination, improve balance and increase proprioception to improve the patients ability to perform ADLs, transfers and gait safely and independently. min Patient Education:  YES  Reviewed HEP   []  Progressed/Changed HEP based on:    Instructed pt to focus on stance with even WBing for exercise and during ADL's such as standing to brush teeth, standing at kitchen counter     Other Objective/Functional Measures:    Pt demo's left foot forward and left knee flexed in standing. Max VCing and demonstration for neutral standing. Noted poor carry over with neutral stance with standing therapeutic activity. Educated pt and pt's aide on what neutral standing looks like and that he should practice this frequently at home (provided handout). Max VCing and TCing to facilitate lateral WSing in stance. Added bridges, LTR, BKFO and LE PNF for core and LE strengthening. Noted intermittent tone and clonus left LE with mat exercises. Min to Mod A for form with therapeutic exercise. Post Treatment Pain Level (on 0 to 10) scale:   0  / 10     ASSESSMENT    Assessment/Changes in Function:     Pt with good tolerance to initiation of therapeutic activity. []  See Progress Note/Recertification   Patient will continue to benefit from skilled PT services to modify and progress therapeutic interventions, address functional mobility deficits, address strength deficits, analyze and cue movement patterns, assess and modify postural abnormalities, address imbalance/dizziness, and instruct in home and community integration to attain remaining goals. Progress toward goals / Updated goals:    1. Patient will demonstrate compliance with HEP. issued HEP-pt left folder in office   2. Patient will score greater than or equal to 6/30 on FGA to indicate improved balance/decreased fall risk. 3. Patient will score greater than or equal to 39/56 on Alexander to indicate improved balance/decreased fall risk.      PLAN    []  Upgrade activities as tolerated YES Continue plan of care   []  Discharge due to :    []  Other:      Therapist: Salvatore Hung PTA    Date: 4/15/2022 Time: 2:59 PM     Future Appointments   Date Time Provider Niesha Echevarria   4/22/2022  1:15 PM [address] [facility] 1 [facility]   4/22/2022  2:00 PM Leticiaeen Primo [facility]   4/28/2022  5:00 PM Elder Hollins OT [facility]   4/29/2022 12:30 PM [facility] OT  [address] [facility]   4/29/2022  1:15 PM Kody Kramer, PT [facility]

## 2022-04-15 NOTE — PROGRESS NOTES
OT DAILY TREATMENT NOTE     Patient Name: Keila Bernardo  Date:4/15/2022  : 1970  [x]  Patient  Verified  Payor: [address] [place] [address] Road / Plan: [address] / Product Type: Managed Care Medicaid /    In time:12:30  Out time:1:05  Total Treatment Time (min): 35  Visit #: 2 of 4  Treatment Area: Generalized muscle weakness [M62.81]  CVA (cerebral vascular accident) ([facility] [address] [I63.9]    SUBJECTIVE  Pain Level (0-10 scale): 0/10  Any medication changes, allergies to medications, adverse drug reactions, diagnosis change, or new procedure performed?: [x] No    [] Yes (see summary sheet for update)  Subjective functional status/changes:   [] No changes reported  Im going to clean his hand before the shower- aidVirige    OBJECTIVE    8 min Therapeutic Exercise:  [] See flow sheet :  -PROM of shoulder, elbow with instruction to caregiver of proper endfields and monitoring patient for pain    Rationale: increase ROM to improve the patients ability to reduce contractures. 8 min Orthotic/Splinting: Therapist and patient's aid discussed resting hand orthosis wear/type in order to determine proper prefabricated orthosis. Rationale: Provided education of orthosis  to improve the patients aids ability to care for contracture of left hand. 20 min Self Care/Home Management:   -Therapist provided demonstration of proper cleaning of left hand, due to severe contracture. -Therapist provided education on opening hand for hygiene.   -Therapist provided education on hygiene and infection risk.  -Therapist observed dead skin and buildup in pt's contracted fist with odor present.  -therapist provided education on nail care to prevent cutting palm and reduce infection risk. Rationale: increase education of left hand hygiene  to improve the patients ability to clean contracture in hand and reduce infection.     With   [] TE   [] TA   [] neuro   [x] other:SC Patient Education: [x] Review HEP    [] Progressed/Changed HEP based on:   [] positioning   [] body mechanics   [] transfers   [] heat/ice application   [] Splint wear/care   [] Sensory re-education   [] scar management      [] other:            Pain Level (0-10 scale) post treatment: 0/10    ASSESSMENT/Changes in Function: Pt. Presents with poor hygiene and increased tone in Left hand and UE. Left hand contracture present. Patient will continue to benefit from skilled OT services to modify and progress therapeutic interventions, address ROM deficits, analyze and address soft tissue restrictions and increase caregiver quality of care to attain remaining goals. [x]  See Plan of Care  []  See progress note/recertification  []  See Discharge Summary         Progress towards goals / Updated goals:  1. Pt. Tia Castorena will be I in HEP to increase joint health. 4/15/22: Progressing, therapist presented HEP to caregiver with instruction and observed caregiver PROM of left UE.     2. Pt. Will be fitted with orthosis in order to reduce hand contracture and allow for hand hygiene. 4/15/22: Progressing, discussed proper orthosis and plan     3. Pt./caregiver will demonstrate proper hand hygiene (washing and full drying) in order to reduce infection risk of hand. 4/15/22: Progressing, therapist gave instructions and demonstration of proper hand care     4.  Pt. Will increase safety awareness of LUE in daily tasks in order to reduce injury risk due to decreased sensation/awaress of arm.  4/15/22: not initiated    PLAN  []  Upgrade activities as tolerated     [x]  Continue plan of care  []  Update interventions per flow sheet       []  Discharge due to:_  []  Other:_      Vania Holman OT 4/15/2022  1:14 PM    Future Appointments   Date Time Provider Niesha Echevarria   4/22/2022  1:15 PM [address] [facility] 1 [facility]   4/22/2022  2:00  [address] [facility]   4/28/2022  5:00 PM Anaya Blount OT [facility]   4/29/2022 12:30 PM [facility] OT AT [address] [facility]   4/29/2022  1:15 PM Marco Eason PT [facility]

## 2022-04-22 ENCOUNTER — TELEPHONE (OUTPATIENT)
Dept: PHYSICAL THERAPY | Age: 52
End: 2022-04-22

## 2022-04-28 ENCOUNTER — APPOINTMENT (OUTPATIENT)
Dept: PHYSICAL THERAPY | Age: 52
End: 2022-04-28
Payer: COMMERCIAL

## 2022-04-29 ENCOUNTER — APPOINTMENT (OUTPATIENT)
Dept: PHYSICAL THERAPY | Age: 52
End: 2022-04-29
Payer: COMMERCIAL

## 2022-05-09 ENCOUNTER — HOSPITAL ENCOUNTER (OUTPATIENT)
Dept: PHYSICAL THERAPY | Age: 52
End: 2022-05-09
Payer: COMMERCIAL

## 2022-05-11 ENCOUNTER — APPOINTMENT (OUTPATIENT)
Dept: PHYSICAL THERAPY | Age: 52
End: 2022-05-11
Payer: COMMERCIAL

## 2022-05-13 ENCOUNTER — APPOINTMENT (OUTPATIENT)
Dept: PHYSICAL THERAPY | Age: 52
End: 2022-05-13
Payer: COMMERCIAL

## 2022-05-20 ENCOUNTER — HOSPITAL ENCOUNTER (OUTPATIENT)
Dept: PHYSICAL THERAPY | Age: 52
Discharge: HOME | End: 2022-05-20
Payer: COMMERCIAL

## 2022-05-20 PROCEDURE — 97530 THERAPEUTIC ACTIVITIES: CPT

## 2022-05-20 PROCEDURE — 97112 NEUROMUSCULAR REEDUCATION: CPT

## 2022-05-20 NOTE — PROGRESS NOTES
[address] [place] [address] PHYSICAL THERAPY  [address] [place] [address] Janene Alas, [address] - Phone: [phone]  Fax: [phone]  PROGRESS NOTE  Patient Name: Kathrin Mcleod : 1970   Treatment/Medical Diagnosis: Gait instability [R26.81]  CVA (cerebral vascular accident) [facility]) [I63.9]   Referral Source: Prabhu Clay MD     Date of Initial Visit: 2022 Attended Visits: 3 Missed Visits: 1     SUMMARY OF TREATMENT  Patient's treatment has consisted of initial evaluation and 2 treatments sessions including balance and gait training, LE strengthening exercises, pt education and instruction in home exercise program.     CURRENT STATUS  Pt was last seen for PT 4/15/22 and has been on hold awaiting signed plan of care per Medicaid guidelines. Pt reports no recent falls and is ambulating household and community distances with a single point cane. Pt resumed PT 22 with good tolerance and reports no change in status. New Goals to be achieved in __2-4__  weeks:  1. Patient will demonstrate compliance with HEP. 2. Patient will score greater than or equal to 6/30 on FGA to indicate improved balance/decreased fall risk. 3. Patient will score greater than or equal to 39/56 on Alexander to indicate improved balance/decreased fall risk. RECOMMENDATIONS  Pt would benefit from continued therapy to address gait and balance deficits due to left hemiparesis to facilitate improved functional mobility, safety and I with gait. If you have any questions/comments please contact us directly at [phone]. Thank you for allowing us to assist in the care of your patient. LPTA Signature: Sana Portillo  Date: 2022   PT Signature: KENAN Fermin Time: 3:37 PM   NOTE TO PHYSICIAN:  PLEASE COMPLETE THE ORDERS BELOW AND FAX TO   [facility]: [phone].   If you are unable to process this request in 24 hours please contact our office: [phone].    ___ I have read the above report and request that my patient continue as recommended.   ___ I have read the above report and request that my patient continue therapy with the following changes/special instructions:_________________________________________________________   ___ I have read the above report and request that my patient be discharged from therapy.      Physician Signature:        Date:       Time:                                                              Arabella Joe MD

## 2022-05-20 NOTE — PROGRESS NOTES
PHYSICAL THERAPY - DAILY TREATMENT NOTE    Patient Name: Wisam Herrera        Date: 2022  : 1970   YES Patient  Verified  Visit #:   3     Insurance: Payor: [address] / Plan: [address] / Product Type: Managed Care Medicaid /      In time: 2:20 Out time: 2:55   Total Treatment Time: 35     Medicare/BCBS [place] Time Tracking (below)   Total Timed Codes (min):  na 1:1 Treatment Time:  na     TREATMENT AREA =  Gait instability [R26.81]  CVA (cerebral vascular accident) ([facility] [address] [I63.9]    SUBJECTIVE    Pain Level (on 0 to 10 scale):  0  / 10   Medication Changes/New allergies or changes in medical history, any new surgeries or procedures? NO    If yes, update Summary List   Subjective Functional Status/Changes:  []  No changes reported     Pt's aide reports they can only come to appointments on Friday's. OBJECTIVE    20 min Therapeutic Activity: sit<>stand   tap ups   dynamic gait ex    Rationale:    increase ROM, increase strength, improve coordination, improve balance and increase proprioception to promote increased functional mobility and increased activity tolerance with ADL's. 15 min Neuromuscular Re-ed: Static standing balance exercises with EO/EC   standing with reaching in multiple directions    Rationale:     improve coordination, improve balance and increase proprioception to improve the patients ability to perform ADLs, transfers and gait safely and independently. min Patient Education:  YES  Reviewed HEP   []  Progressed/Changed HEP based on:   Encouraged pt to walk frequently, use SPC for balance. Other Objective/Functional Measures: Added retro and sidestepping gait. Pt unsteady and with impaired gait mechanics, SBA for safety, but no LOB noted     Added step overs 2 X 4 with use of SPC for balance, pt able to step over with left LE and no LOB.       Pt able to demo stance and multidirectional reaching with right UE without LOB.      Post Treatment Pain Level (on 0 to 10) scale:   0  / 10     ASSESSMENT    Assessment/Changes in Function:     Pt demo's ability to step over small obstacle with left LE and no LOB. [x]  See Progress Note/Recertification   Patient will continue to benefit from skilled PT services to modify and progress therapeutic interventions, address functional mobility deficits, address strength deficits, analyze and cue movement patterns, assess and modify postural abnormalities, address imbalance/dizziness, and instruct in home and community integration to attain remaining goals. Progress toward goals / Updated goals: · Short Term Goals: To be accomplished in  2-4  weeks:  1. Patient will demonstrate compliance with HEP. NOT MET   2. Patient will score greater than or equal to 6/30 on FGA to indicate improved balance/decreased fall risk. 3. Patient will score greater than or equal to 39/56 on Alexander to indicate improved balance/decreased fall risk.      PLAN    []  Upgrade activities as tolerated YES Continue plan of care   []  Discharge due to :    []  Other:      Therapist: Kacy Brownlee PTA    Date: 5/20/2022 Time: 3:30 PM     Future Appointments   Date Time Provider Niesha Echevarria   5/27/2022  2:00 PM Urvashi Blnaco [facility]   6/3/2022  8:00 AM Franklin Chan PT [facility]   6/10/2022  1:15 PM Franklin Chan PT [facility]   6/17/2022  1:15 PM Eusebia Pearl PT [facility]   6/24/2022  1:15 PM Eusebia Pearl PT [facility]

## 2022-05-27 ENCOUNTER — APPOINTMENT (OUTPATIENT)
Dept: PHYSICAL THERAPY | Age: 52
End: 2022-05-27
Payer: COMMERCIAL

## 2022-06-03 ENCOUNTER — APPOINTMENT (OUTPATIENT)
Dept: PHYSICAL THERAPY | Age: 52
End: 2022-06-03
Payer: COMMERCIAL

## 2022-06-06 ENCOUNTER — APPOINTMENT (OUTPATIENT)
Dept: PHYSICAL THERAPY | Age: 52
End: 2022-06-06
Payer: COMMERCIAL

## 2022-06-15 NOTE — PROGRESS NOTES
[address] [facility] PHYSICAL THERAPY  [address] [facility], [address] - Phone: [phone]  Fax: [phone]  [address] [place] [address]          Patient Name: Champ Homans : 1970   Medical/Treatment Diagnosis: Generalized muscle weakness [M62.81]  CVA (cerebral vascular accident) [facility]) [I63.9]   Onset Date:     Referral Source: Nydia Graham MD Start of Care [facility]): 2022   Prior Hospitalization: See Medical History Provider #: [id number]   Prior Level of Function: ADLs with assistance   Comorbidities: HIV/AIDS, depression, alcohol use, high blood pressure, tobacco use, CVA   Medications: Verified on Patient Summary List   Visits from [facility]: 2 Missed Visits: 1       Goal/Measure of Progress Goal Met? 1.  Pt. /caregiver will be I in HEP to increase joint health. Status at last Eval: Progressing, therapist presented HEP to caregiver with instruction and observed caregiver PROM of left UE Current Status: Not tested, pt not present for visit. no   2. Pt. Will be fitted with orthosis in order to reduce hand contracture and allow for hand hygiene. Status at last Eval: Progressing, discussed proper orthosis and plan Current Status: Not tested, pt not present for visit. no   3. Pt./caregiver will demonstrate proper hand hygiene (washing and full drying) in order to reduce infection risk of hand. Status at last Eval: Progressing, therapist gave instructions and demonstration of proper hand care Current Status: Not tested, pt not present for visit. no   4. Pt. Will increase safety awareness of LUE in daily tasks in order to reduce injury risk due to decreased sensation/awaress of arm. Status at last Eval: Not initiated Current Status: Not tested, pt not present for visit. no     Key Functional Changes/Progress: Pt has not been seen since 4/15/2022. Multiple attempts have been made to contact pt to reschedule appointments.  Pt has not returned calls. Pt will be d/c from therapy services at this time. Assessments/Recommendations: Discontinue therapy due to lack of attendance or compliance. If you have any questions/comments please contact us directly at [phone]. Thank you for allowing us to assist in the care of your patient. Therapist Signature: OLINDA Mlies/L Date: 6/15/2022   Reporting Period: 4/15/2022 - 6/15/2022 Time: 8:26 AM       NOTE TO PHYSICIAN:  PLEASE COMPLETE THE ORDERS BELOW AND FAX TO   [facility]: [phone] If you are unable to process this request in 24 hours please contact our office: [phone].    ___ I have read the above report and request that my patient be discharged from therapy.      Physician Signature:        Date:       Time:                                       Rosalva Cunningham MD

## 2022-06-17 ENCOUNTER — HOSPITAL ENCOUNTER (OUTPATIENT)
Dept: PHYSICAL THERAPY | Age: 52
Discharge: HOME | End: 2022-06-17
Payer: COMMERCIAL

## 2022-06-17 PROCEDURE — 97530 THERAPEUTIC ACTIVITIES: CPT

## 2022-06-17 PROCEDURE — 97110 THERAPEUTIC EXERCISES: CPT

## 2022-06-17 PROCEDURE — 97112 NEUROMUSCULAR REEDUCATION: CPT

## 2022-06-17 NOTE — PROGRESS NOTES
PHYSICAL THERAPY - DAILY TREATMENT NOTE    Patient Name: Young Bauer        Date: 2022  : 1970   YES Patient  Verified  Visit #:   4     Insurance: Payor: [address] [place] [address] Road / Plan: [address] / Product Type: Managed Care Medicaid /      In time: 1:17 Out time: 1:57   Total Treatment Time: 40     Medicare/BCBS [place] Time Tracking (below)   Total Timed Codes (min):  NA 1:1 Treatment Time:  NA     TREATMENT AREA =  Gait instability [R26.81]  CVA (cerebral vascular accident) ([facility] [address] [I63.9]  SUBJECTIVE    Pain Level (on 0 to 10 scale):  0  / 10   Medication Changes/New allergies or changes in medical history, any new surgeries or procedures? NO    If yes, update Summary List   Subjective Functional Status/Changes:  []  No changes reported     Pt without complaints, requests copy of HEP.           OBJECTIVE    18 min Therapeutic Exercise:  [x]  See flow sheet   Rationale:      increase ROM, increase strength, improve coordination, improve balance and increase proprioception to improve the patients ability to perform ADLs/IADLs, functional mobility and gait safely and independently without increased pain/symptoms     14 min Therapeutic Activity: See flow sheet   Rationale:  increase ROM, increase strength, improve coordination, improve balance and increase proprioception to improve the patients ability to perform ADLs/IADLs, functional mobility and gait safely and independently without increased pain/symptoms      8 min Neuromuscular Re-ed: See flow sheet   Rationale:  increase ROM, increase strength, improve coordination, improve balance and increase proprioception to improve the patients ability to perform ADLs/IADLs, functional mobility and gait safely and independently without increased pain/symptoms       During TE/TA/NM min Patient Education:  YES  Reviewed HEP   [x]  Progressed/Changed HEP based on:   Issued HEP     Other Objective/Functional Measures:    Exercises per flow sheet     Post Treatment Pain Level (on 0 to 10) scale:   0  / 10     ASSESSMENT    Assessment/Changes in Function:     Tolerated exercises without complaints     []  See Progress Note/Recertification   Patient will continue to benefit from skilled PT services to modify and progress therapeutic interventions, address functional mobility deficits, address ROM deficits, address strength deficits, analyze and address soft tissue restrictions, analyze and cue movement patterns, analyze and modify body mechanics/ergonomics, assess and modify postural abnormalities, address imbalance/dizziness and instruct in home and community integration to attain remaining goals. Progress toward goals / Updated goals:    Progressing toward goals:  1. Patient will demonstrate compliance with HEP. - Issued HEP  2. Patient will score greater than or equal to 6/30 on FGA to indicate improved balance/decreased fall risk.   3. Patient will score greater than or equal to 39/56 on Alexander to indicate improved balance/decreased fall risk.        PLAN    [x]  Upgrade activities as tolerated YES Continue plan of care   []  Discharge due to :    []  Other:      Therapist: Yesika Pereira PT    Date: 6/17/2022 Time: 1:21 PM     Future Appointments   Date Time Provider Niesha Echevarria   6/24/2022  1:15 PM Oscar Rojas, PT [facility]

## 2022-07-08 NOTE — PROGRESS NOTES
[address] [place] [address] PHYSICAL THERAPY  [address] [place] [address], [address] - Phone: [phone]  Fax: [phone] SUMMARY  Patient Name: Poly Kidd : 1970   Treatment/Medical Diagnosis: Gait instability [R26.81]  CVA (cerebral vascular accident) [facility]) [I63.9]   Referral Source: Lilo Mccormack MD     Date of Initial Visit: 2022 Attended Visits: 4 Missed Visits: 4     SUMMARY OF TREATMENT  Treatment has consisted of initial evaluation and 3 treatment sessions, which included strengthening exercises, balance training, functional mobility training, gait training and patient/caregiver education (including HEP). CURRENT STATUS  Patient was last seen in clinic on 2022, at which time he denied pain and requested another copy of his HEP (which was provided). Patient did not show for appointment scheduled for 2022. PT attempted to call patient, but there was no answer and voicemail mailbox was full. PT attempted to call patient again on 2022, but there was no answer and voicemail mailbox was full. Patient had previous no shows on 2022 and 2022, as well as a cancellation on 2022. Will discharge at this time per clinic no show/cancellation policy. Goal/Measure of Progress Goal Met? 1. Patient will demonstrate compliance with HEP. Status at last Eval: NA Current Status: HEP issued no   2. Patient will score greater than or equal to 6/30 on FGA to indicate improved balance/decreased fall risk. Status at last Eval: FGA = 3/30 Current Status: NT no   3. Patient will score greater than or equal to 39/56 on Alexander to indicate improved balance/decreased fall risk. Status at last Eval: Alexander = 36/56 Current Status: NT no     RECOMMENDATIONS  Discontinue therapy due to lack of attendance or compliance. If you have any questions/comments please contact us directly at [phone].    Thank you for allowing us to assist in the care of your patient. Therapist Signature: Noemi Webb PT Date: 7/8/2022     Time: 9:09 AM     NOTE TO PHYSICIAN:  PLEASE COMPLETE THE ORDERS BELOW AND FAX TO   [facility]: [phone] If you are unable to process this request in 24 hours please contact our office: [phone]. To ensure we are able to process the patients encounter and avoid risk of your patient receiving a bill for our services, please sign and return this discharge summary by 8/7/2022. (30days following DC date). Per Medicaid guidelines, discharge summary must be signed by physician.    ___ I have read the above report and request that my patient be discharged from therapy.      Physician Signature:        Date:       Time:

## 2023-12-28 ENCOUNTER — TELEPHONE (OUTPATIENT)
Age: 53
End: 2023-12-28

## 2023-12-28 NOTE — TELEPHONE ENCOUNTER
Patient [id number]  Denny Iniguez from [facility] to Dr. Andujar    Nondisplaced fracture of base of 5th metacarpal  , left hand, initial encounter for closed fracture seen  on 12/03/2023 at [facility].    LILA Iniguez asking for a follow up of the left hand fracture     Please advise when to schedule patient  Patient can be reached at [phone]

## 2024-02-09 ENCOUNTER — OFFICE VISIT (OUTPATIENT)
Age: 54
End: 2024-02-09
Payer: MEDICAID

## 2024-02-09 VITALS — WEIGHT: 128 LBS | HEIGHT: 64 IN | BODY MASS INDEX: 21.85 KG/M2

## 2024-02-09 DIAGNOSIS — M24.542 CONTRACTURE OF LEFT HAND: Primary | ICD-10-CM

## 2024-02-09 PROBLEM — F41.9 ANXIETY: Status: ACTIVE | Noted: 2018-06-04

## 2024-02-09 PROBLEM — D74.9 METHEMOGLOBINEMIA: Status: ACTIVE | Noted: 2018-08-27

## 2024-02-09 PROBLEM — I77.810 AORTIC ROOT DILATION (HCC): Status: ACTIVE | Noted: 2018-09-07

## 2024-02-09 PROBLEM — T65.91XA INGESTION OF SUBSTANCE: Status: ACTIVE | Noted: 2020-06-02

## 2024-02-09 PROBLEM — G40.909 EPILEPSY (HCC): Status: ACTIVE | Noted: 2018-08-26

## 2024-02-09 PROBLEM — F17.213 CIGARETTE NICOTINE DEPENDENCE WITH WITHDRAWAL: Status: ACTIVE | Noted: 2018-06-04

## 2024-02-09 PROBLEM — F25.9 SCHIZO AFFECTIVE SCHIZOPHRENIA (HCC): Status: ACTIVE | Noted: 2018-11-11

## 2024-02-09 PROBLEM — N17.9 AKI (ACUTE KIDNEY INJURY) (HCC): Status: ACTIVE | Noted: 2018-08-21

## 2024-02-09 PROBLEM — R45.851 SUICIDAL IDEATIONS: Status: ACTIVE | Noted: 2018-01-16

## 2024-02-09 PROBLEM — G93.40 ACUTE ENCEPHALOPATHY: Status: ACTIVE | Noted: 2024-02-09
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
## 2024-02-09 PROBLEM — A63.0 ANAL CONDYLOMA: Status: ACTIVE | Noted: 2017-11-13

## 2024-02-09 PROBLEM — F14.90 COCAINE USE: Status: ACTIVE | Noted: 2024-02-09

## 2024-02-09 PROBLEM — F33.2 SEVERE EPISODE OF RECURRENT MAJOR DEPRESSIVE DISORDER, WITHOUT PSYCHOTIC FEATURES (HCC): Status: ACTIVE | Noted: 2017-04-13

## 2024-02-09 PROBLEM — T50.902A INTENTIONAL DRUG OVERDOSE (HCC): Status: ACTIVE | Noted: 2020-06-02

## 2024-02-09 PROBLEM — R53.83 FATIGUE: Status: ACTIVE | Noted: 2024-02-09

## 2024-02-09 PROBLEM — R40.4 TRANSIENT ALTERATION OF AWARENESS: Status: ACTIVE | Noted: 2024-02-09

## 2024-02-09 PROBLEM — Z76.5 MALINGERING: Status: ACTIVE | Noted: 2017-09-05

## 2024-02-09 PROBLEM — A41.9 SEPSIS (HCC): Status: ACTIVE | Noted: 2018-08-22

## 2024-02-09 PROBLEM — F10.939 ALCOHOL WITHDRAWAL (HCC): Status: ACTIVE | Noted: 2021-09-18
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
## 2024-02-09 PROBLEM — A63.0 CONDYLOMA: Status: ACTIVE | Noted: 2017-12-25

## 2024-02-09 PROBLEM — R52 PAIN: Status: ACTIVE | Noted: 2024-02-09

## 2024-02-09 PROBLEM — D58.9 HEMOLYTIC ANEMIA (HCC): Status: ACTIVE | Noted: 2018-08-27

## 2024-02-09 PROBLEM — F33.3 MDD (MAJOR DEPRESSIVE DISORDER), RECURRENT, SEVERE, WITH PSYCHOSIS (HCC): Status: ACTIVE | Noted: 2018-10-22

## 2024-02-09 PROBLEM — F11.20 HEROIN USE DISORDER, MODERATE, DEPENDENCE (HCC): Status: ACTIVE | Noted: 2017-08-14

## 2024-02-09 PROBLEM — I10 BENIGN HYPERTENSION: Status: ACTIVE | Noted: 2017-11-19

## 2024-02-09 PROBLEM — F31.75 BIPOLAR DISORDER, IN PARTIAL REMISSION, MOST RECENT EPISODE DEPRESSED (HCC): Status: ACTIVE | Noted: 2017-09-15

## 2024-02-09 PROBLEM — F14.20 COCAINE USE DISORDER, SEVERE, DEPENDENCE (HCC): Status: ACTIVE | Noted: 2017-08-14

## 2024-02-09 PROBLEM — I63.9 CEREBROVASCULAR ACCIDENT (CVA) (HCC): Status: ACTIVE | Noted: 2024-02-09

## 2024-02-09 PROBLEM — U07.1 COVID-19: Status: ACTIVE | Noted: 2024-02-09

## 2024-02-09 PROBLEM — T79.6XXA TRAUMATIC RHABDOMYOLYSIS (HCC): Status: ACTIVE | Noted: 2018-08-24

## 2024-02-09 PROBLEM — F43.20 ADJUSTMENT REACTION: Status: ACTIVE | Noted: 2018-05-03

## 2024-02-09 PROBLEM — F14.94 COCAINE-INDUCED MOOD DISORDER (HCC): Status: ACTIVE | Noted: 2018-07-14

## 2024-02-09 PROBLEM — R53.1 WEAKNESS: Status: ACTIVE | Noted: 2024-02-09

## 2024-02-09 PROBLEM — F12.10 CANNABIS ABUSE: Status: ACTIVE | Noted: 2024-02-09

## 2024-02-09 PROBLEM — R07.9 CHEST PAIN: Status: ACTIVE | Noted: 2017-08-29

## 2024-02-09 PROCEDURE — 99203 OFFICE O/P NEW LOW 30 MIN: CPT | Performed by: ORTHOPAEDIC SURGERY

## 2024-02-09 PROCEDURE — 73130 X-RAY EXAM OF HAND: CPT | Performed by: ORTHOPAEDIC SURGERY

## 2024-02-09 NOTE — PROGRESS NOTES
Denny Downey is a 53 y.o. male right handed individual, not currently working.  Worker's Compensation and legal considerations: none    Chief Complaint   Patient presents with    Hand Pain     Left hand pain     Pain Score:   7    HPI: Patient presents today with a history of hand contracture of the left hand.  He does report falling within the past several months and someone mentioned the possibility of a fracture in his hand.  He reports pain but it appears mostly to be due to his contracture.  He has a history of stroke.    Date of onset: Chronic  Injury: No uncertain  Prior Treatment:  No    ROS: Review of Systems - General ROS: negative except HPI    Past Medical History:   Diagnosis Date    Autoimmune disease (HCC)     Depression     Heart failure (HCC)     HIV (human immunodeficiency virus infection) (HCC)     HTN (hypertension)     Seizure (HCC)     Seizures (HCC)     Stroke (HCC)     Substance abuse (HCC)        History reviewed. No pertinent surgical history.     Current Outpatient Medications   Medication Sig Dispense Refill    aspirin 81 MG chewable tablet Take 81 mg by mouth daily      atorvastatin (LIPITOR) 40 MG tablet Take 40 mg by mouth daily      benztropine (COGENTIN) 1 MG tablet Take 1 mg by mouth 2 times daily      bictegravir-emtricitab-tenofovir alafenamide (BIKTARVY) -15 MG TABS Take by mouth      buPROPion (ZYBAN) 150 MG extended release tablet Take 150 mg by mouth 2 times daily      citalopram (CELEXA) 40 MG tablet Take 40 mg by mouth daily      divalproex (DEPAKOTE ER) 500 MG extended release tablet Take 500 mg by mouth      emtricitabine-tenofovir (TRUVADA) 200-300 MG per tablet Take by mouth daily      ergocalciferol (ERGOCALCIFEROL) 1.25 MG (94902 UT) capsule Take 50,000 Units by mouth      folic acid (FOLVITE) 1 MG tablet Take by mouth daily      levETIRAcetam (KEPPRA) 500 MG tablet Take by mouth 2 times daily      lisinopril (PRINIVIL;ZESTRIL) 10 MG tablet Take by mouth daily

## 2024-03-10 PROBLEM — R52 PAIN: Status: RESOLVED | Noted: 2024-02-09 | Resolved: 2024-03-10

## 2024-03-21 ENCOUNTER — HOSPITAL ENCOUNTER (OUTPATIENT)
Facility: HOSPITAL | Age: 54
Setting detail: RECURRING SERIES
Discharge: HOME | End: 2024-03-24
Payer: MEDICAID

## 2024-03-21 PROCEDURE — 97161 PT EVAL LOW COMPLEX 20 MIN: CPT

## 2024-03-21 NOTE — PROGRESS NOTES
PHYSICAL / OCCUPATIONAL THERAPY - DAILY TREATMENT NOTE (updated )    Patient Name: Denny Downey    Date: 3/21/2024    : 1970  Insurance: Payor: [facility] MEDICAID / [facility]: [facility] [employer] [facility]) / Product Type: *No Product type* /      Patient  verified Yes     Visit #   Current / Total 1 16   Time   In / Out 3:42 4:23   Pain   In / Out 7 7   Subjective Functional Status/Changes: See POC   Changes to:  Meds, Allergies, Med Hx, Sx Hx?  If yes, update Summary List no       TREATMENT AREA =  Other abnormalities of gait and mobility [R26.89]    OBJECTIVE    21 min   Eval - untimed                      Therapeutic Procedures:  Tx Min Billable or 1:1 Min (if diff from Tx Min) Procedure, Rationale, Specifics   10 0 62394 Therapeutic Exercise (timed):  increase ROM, strength, coordination, balance, and proprioception to improve patient's ability to progress to PLOF and address remaining functional goals. (see flow sheet as applicable)     Details if applicable:  HEP instruction and demonstration     10 0 07390 Self Care/Home Management (timed):  improve patient knowledge and understanding of pain reducing techniques, positioning, posture/ergonomics, home safety, activity modification, diagnosis/prognosis, and physical therapy expectations, procedures and progression  to improve patient's ability to progress to PLOF and address remaining functional goals.  (see flow sheet as applicable)     Details if applicable:  pt education on relevant anatomy/physiology    20 0 [facility] BC Totals Reminder: bill using total billable min of TIMED therapeutic procedures (example: do not include dry needle or estim unattended, both untimed codes, in totals to left)  8-22 min = 1 unit; 23-37 min = 2 units; 38-52 min = 3 units; 53-67 min = 4 units; 68-82 min = 5 units   Total Total     TOTAL TREATMENT TIME:        41     [x]  Patient Education billed concurrently with other procedures   [x] Review HEP    [] 
chair with no UE assist, leans to the right   3.  Pt will improve TUG time to 27 seconds with his SBQC to improve the pt's gait quality.  Status at last note/certification: 34 seconds with SBQC  4.  Pt will report an improvement in at worst pain in his B legs to 4/10 to improve independence with daily tasks.   Status at last note/certification: 7/10     Frequency / Duration: Patient would benefit from skilled PT 1-2 times per week for up to 16 sessions as needed in this certification period.  Goals will be assigned and reassessed every 10 visits/ 30 days per guidelines.  Patient/ Caregiver education and instruction: Diagnosis, prognosis, self care, activity modification, and exercises [x]  Plan of care has been reviewed with PTA    Nadya Burgos, PT       3/21/2024       10:15 AM  ===================================================================  I certify that the above Therapy Services are being furnished while the patient is under my care. I agree with the treatment plan and certify that this therapy is necessary.    Physician's Signature:_________________________   DATE:_________   TIME:________                           Javi Iniguez MD    ** Signature, Date and Time must be completed for valid certification **  Please sign and return to [facility] or you may fax the signed copy to [phone].  Thank you.

## 2024-04-12 ENCOUNTER — HOSPITAL ENCOUNTER (OUTPATIENT)
Facility: HOSPITAL | Age: 54
Setting detail: RECURRING SERIES
End: 2024-04-12
Payer: MEDICAID

## 2024-04-15 ENCOUNTER — HOSPITAL ENCOUNTER (OUTPATIENT)
Facility: HOSPITAL | Age: 54
Setting detail: RECURRING SERIES
Discharge: HOME | End: 2024-04-18
Payer: MEDICAID

## 2024-04-15 PROCEDURE — 97112 NEUROMUSCULAR REEDUCATION: CPT

## 2024-04-15 PROCEDURE — 97530 THERAPEUTIC ACTIVITIES: CPT

## 2024-04-15 PROCEDURE — 97116 GAIT TRAINING THERAPY: CPT

## 2024-04-15 PROCEDURE — 97110 THERAPEUTIC EXERCISES: CPT

## 2024-04-15 NOTE — PROGRESS NOTES
PHYSICAL / OCCUPATIONAL THERAPY - DAILY TREATMENT NOTE (updated )    Patient Name: Denny Downey    Date: 4/15/2024    : 1970  Insurance: Payor: /      Patient  verified {YES/NO:60087}     Visit #   Current / Total *** ***   Time   In / Out *** ***   Pain   In / Out *** ***   Subjective Functional Status/Changes: ***     TREATMENT AREA =  Other abnormalities of gait and mobility [R26.89]    OBJECTIVE    Modalities Rationale:     {InMotion Modality Rationale:08268} to improve patient's ability to progress to PLOF and address remaining functional goals.     min [] Estim Unattended, type/location:                                      []  w/ice    []  w/heat    min [] Estim Attended, type/location:                                     []  w/US     []  w/ice    []  w/heat    []  TENS insruct      min []  Mechanical Traction: type/lbs                   []  pro   []  sup   []  int   []  cont    []  before manual    []  after manual    min []  Ultrasound, settings/location:      min []  Iontophoresis w/ dexamethasone, location:                                               []  take home patch       []  in clinic    min  unbill []  Ice     []  Heat    location/position:     min []  Paraffin,  details:     min []  Vasopneumatic Device, press/temp:     min []  Whirlpool / Fluido:    If using vaso (only need to measure limb vaso being performed on)      pre-treatment girth :       post-treatment girth :       measured at (landmark location) :      min []  Other:    Skin assessment post-treatment (if applicable):    []  intact    []  redness- no adverse reaction                 []redness - adverse reaction:        Therapeutic Procedures:  Tx Min Billable or 1:1 Min (if diff from Tx Min) Procedure, Rationale, Specifics     {InMotion Ther Procedures:31152}     Details if applicable:         {InMotion Ther Procedures:17169}     Details if applicable:       {InMotion Ther Procedures:99808}     Details if applicable:

## 2024-04-15 NOTE — PROGRESS NOTES
PHYSICAL / OCCUPATIONAL THERAPY - DAILY TREATMENT NOTE (updated )    Patient Name: Denny Downey    Date: 4/15/2024    : 1970  Insurance: Payor: [facility] MEDICAID / Plan: [facility] PLAN([facility]) / Product Type: *No Product type* /      Patient  verified Yes     Visit #   Current / Total 2 16   Time   In / Out 1:40 2:19   Pain   In / Out 0 0   Subjective Functional Status/Changes: \"I had one fall a few weeks ago.\"     TREATMENT AREA =  Other abnormalities of gait and mobility [R26.89]    OBJECTIVE    Therapeutic Procedures:  Tx Min  Procedure, Rationale, Specifics   8  12564 Therapeutic Exercise (timed):  increase ROM, strength, coordination, balance, and proprioception to improve patient's ability to progress to PLOF and address remaining functional goals. (see flow sheet as applicable)     Details if applicable:  LE strengthening     11  52640 Neuromuscular Re-Education (timed):  improve balance, coordination, kinesthetic sense, posture, core stability and proprioception to improve patient's ability to develop conscious control of individual muscles and awareness of position of extremities in order to progress to PLOF and address remaining functional goals. (see flow sheet as applicable)     Details if applicable:  LE motor control re-ed     12  76233 Therapeutic Activity (timed):  use of dynamic activities replicating functional movements to increase ROM, strength, coordination, balance, and proprioception in order to improve patient's ability to progress to PLOF and address remaining functional goals.  (see flow sheet as applicable)    Details if applicable:  bridge, STS, step taps, step overs     8  35482 Gait Training (timed):    2 x20 feet with SBQC (assistive device) over level surfaces with mod assist level of assist. Cuing for improve left weight shift.  To improve safety and dynamic movement with household/community ambulation.  (see flow sheet as applicable)      Details if

## 2024-04-22 ENCOUNTER — HOSPITAL ENCOUNTER (OUTPATIENT)
Facility: HOSPITAL | Age: 54
Setting detail: RECURRING SERIES
Discharge: HOME | End: 2024-04-25
Payer: MEDICAID

## 2024-04-22 PROCEDURE — 97530 THERAPEUTIC ACTIVITIES: CPT

## 2024-04-22 PROCEDURE — 97110 THERAPEUTIC EXERCISES: CPT

## 2024-04-22 PROCEDURE — 97112 NEUROMUSCULAR REEDUCATION: CPT

## 2024-04-22 NOTE — PROGRESS NOTES
PHYSICAL / OCCUPATIONAL THERAPY - DAILY TREATMENT NOTE (updated )    Patient Name: Denny Downey    Date: 2024    : 1970  Ins[facility]: [facility] / Plan: [facility] PLAN([facility]) / Product Type: *No Product type* /      Patient  verified Yes     Visit #   Current / Total 3 10   Time   In / Out 5:01 pm 5:41 pm   Pain   In / Out 0/10 010   Subjective Functional Status/Changes: Patient states he has been practicing walking up and down the stairs more often.     TREATMENT AREA =  Other abnormalities of gait and mobility [R26.89]    OBJECTIVE  Therapeutic Procedures:  Tx Min Billable or 1:1 Min (if diff from Tx Min) Procedure, Rationale, Specifics   16 16 27265 Therapeutic Exercise (timed):  increase ROM, strength, coordination, balance, and proprioception to improve patient's ability to progress to PLOF and address remaining functional goals. (see flow sheet as applicable)     Details if applicable: reassessment      14 14 68991 Neuromuscular Re-Education (timed):  improve balance, coordination, kinesthetic sense, posture, core stability and proprioception to improve patient's ability to develop conscious control of individual muscles and awareness of position of extremities in order to progress to PLOF and address remaining functional goals. (see flow sheet as applicable)     Details if applicable:     10 10 43160 Therapeutic Activity (timed):  use of dynamic activities replicating functional movements to increase ROM, strength, coordination, balance, and proprioception in order to improve patient's ability to progress to PLOF and address remaining functional goals.  (see flow sheet as applicable)     Details if applicable:            Details if applicable:            Details if applicable:     40 40 [facility] BC Totals Reminder: bill using total billable min of TIMED therapeutic procedures (example: do not include dry needle or estim unattended, both untimed codes, in totals to

## 2024-04-23 NOTE — PROGRESS NOTES
[facility] [place]   [address] [place] [address]  Phone: [phone] Fax: [phone]  PROGRESS NOTE  Patient Name: Denny Downey : 1970   Treatment/Medical Diagnosis: Other abnormalities of gait and mobility [R26.89]   Referral Source:  Payor Javi Iniguez MD  Payor: [facility] MEDICAID / Plan: [facility] PLAN([facility]) / Product Type: *No Product type* /      Date of Initial Visit: 3/21/24 Attended Visits: 3 Missed Visits: 2     SUMMARY OF TREATMENT  Pt is a 53 year old male who presents to therapy today with impaired balance/gait and muscle weakness. He has a hx of CVAs in  and .     CURRENT STATUS  Patient has attended 3 out of 5 sessions from 3/21/24-24, making steady progress at this time, despite limited treatments due to awaiting insurance authorization. Patient reports improved ease with ascending stairs, but decreased safety when descending. C/c regarding fatigue when walking (I) within the home and with single limb stance transfers. Assessment for continuation as follows:    FOTO score: 37/100     Strength:     Hip strength: flexion (R) 4+/5, (L) 4/5  Knee strength: flexion (R) 4+/5, (L) 3+/5; extension (R) 5/5, (L) 4+/5  Ankle strength: (R) 5/5, (L) <3/5     5x sit to stand: 16 seconds, 10 reps in 30 seconds  TUG with SBQC: 20 seconds  SLS: (L) unable, (R) 8 seconds      Progress toward goals / Updated goals:    Short Term Goals: To be accomplished in 2 treatments   Pt will report compliance and independence to HEP to help the pt manage their pain and symptoms.  Status at last note/certification:  established  Current: goal progressing; initiated HEP  Long Term Goals: To be accomplished in 16 treatments  Pt will increase FOTO score to 51 points to improve ability to perform ADLs.  Status at last note/certification: 46 points  Current: goal progressing; 37 points  2.  Pt will improve 5x sit to stand

## 2024-04-26 ENCOUNTER — HOSPITAL ENCOUNTER (OUTPATIENT)
Facility: HOSPITAL | Age: 54
Setting detail: RECURRING SERIES
End: 2024-04-26
Payer: MEDICAID

## 2024-04-29 ENCOUNTER — HOSPITAL ENCOUNTER (OUTPATIENT)
Facility: HOSPITAL | Age: 54
Setting detail: RECURRING SERIES
Discharge: HOME | End: 2024-05-02
Payer: MEDICAID

## 2024-04-29 PROCEDURE — 97530 THERAPEUTIC ACTIVITIES: CPT

## 2024-04-29 PROCEDURE — 97110 THERAPEUTIC EXERCISES: CPT

## 2024-04-29 PROCEDURE — 97112 NEUROMUSCULAR REEDUCATION: CPT

## 2024-04-29 NOTE — PROGRESS NOTES
concurrently with other procedures  [x] Review HEP    [] Progressed/Changed HEP, detail:  [] Other detail:    Objective Information/Functional Measures/Assessment:  Reported no pain post session today. Had pt hold on to the parallel bar with the left UE to help decrease spasticity during balancing exercises. Some difficulty with sequencing with exercises today. He needs cues to leave room for his other leg when performing lateral baldo step overs. Continue POC as tolerated to improve strength, balance, and stability.     Patient will continue to benefit from skilled PT / OT services to modify and progress therapeutic interventions, analyze and address functional mobility deficits, analyze and address ROM deficits, analyze and address strength deficits, analyze and address soft tissue restrictions, analyze and cue for proper movement patterns, analyze and modify for postural abnormalities, analyze and address imbalance/dizziness, and instruct in home and community integration to address functional deficits and attain remaining goals.    Progress toward goals / Updated goals:  []  See Progress Note/Recertification  New Goals to be achieved in __4__ WEEKS  1. Pt will increase FOTO score to 51 points to improve ability to perform ADLs.  Status at last note/certification: 37 points  2.  Pt will improve 5x sit to stand time to 13 seconds to improve the pt's fall risk.  Status at last note/certification: 16 seconds from a chair with no UE assist, leans to the right  3.  Pt will improve TUG time to 27 seconds with his SBQC to improve the pt's gait quality.  Status at last note/certification: 20 seconds with SBQC  Current: continued SBQC with ambulation 4/29/2024  4.  Pt will report an improvement in at worst pain in his B legs to 4/10 to improve independence with daily tasks.   Status at last note/certification: remains 7/10    PN Due 5/20/24  Auth 15 visits visits expires 6/21/24    PLAN  Yes  Continue plan of care  [x]

## 2024-05-06 ENCOUNTER — HOSPITAL ENCOUNTER (OUTPATIENT)
Facility: HOSPITAL | Age: 54
Setting detail: RECURRING SERIES
Discharge: HOME | End: 2024-05-09
Payer: MEDICAID

## 2024-05-06 PROCEDURE — 97112 NEUROMUSCULAR REEDUCATION: CPT

## 2024-05-06 PROCEDURE — 97116 GAIT TRAINING THERAPY: CPT

## 2024-05-06 PROCEDURE — 97530 THERAPEUTIC ACTIVITIES: CPT

## 2024-05-06 NOTE — PROGRESS NOTES
PHYSICAL / OCCUPATIONAL THERAPY - DAILY TREATMENT NOTE (updated )    Patient Name: Denny Downey    Date: 2024    : 1970  Insurance: Payor: [facility] MEDICAID / Plan: [facility] PLAN([facility]) / Product Type: *No Product type* /      Patient  verified Yes     Visit #   Current / Total 5 10   Time   In / Out 9:03 AM 9:48 AM    Pain   In / Out 0 0   Subjective Functional Status/Changes: \"I am doing alright, no pain.\"      TREATMENT AREA =  Other abnormalities of gait and mobility [R26.89]    OBJECTIVE  Therapeutic Procedures:  Tx Min  45 Billable or 1:1 Min (if diff from Tx Min)  43   Procedure, Rationale, Specifics     29601 Therapeutic Exercise (timed):  increase ROM, strength, coordination, balance, and proprioception to improve patient's ability to progress to PLOF and address remaining functional goals. (see flow sheet as applicable)     Details if applicable:     16 16   27456 Gait Training (timed): ambulated with quad base cane in RUE over flat, hard surface. CGA provided for safety. Cuing for step length (R foot carry through), upward gaze, caution with narrow base of support, turning mechanics, and safe use of cane.      14 14 26622 Neuromuscular Re-Education (timed):  improve balance, coordination, kinesthetic sense, posture, core stability and proprioception to improve patient's ability to develop conscious control of individual muscles and awareness of position of extremities in order to progress to PLOF and address remaining functional goals. (see flow sheet as applicable)     Details if applicable:  balance/ stability interventions, quad re-ed      15 13 34695 Therapeutic Activity (timed):  use of dynamic activities replicating functional movements to increase ROM, strength, coordination, balance, and proprioception in order to improve patient's ability to progress to PLOF and address remaining functional goals.  (see flow sheet as applicable)     Details if applicable:

## 2024-05-13 ENCOUNTER — HOSPITAL ENCOUNTER (OUTPATIENT)
Facility: HOSPITAL | Age: 54
Setting detail: RECURRING SERIES
Discharge: HOME | End: 2024-05-16
Payer: MEDICAID

## 2024-05-13 PROCEDURE — 97535 SELF CARE MNGMENT TRAINING: CPT

## 2024-05-13 PROCEDURE — 97112 NEUROMUSCULAR REEDUCATION: CPT

## 2024-05-13 PROCEDURE — 97530 THERAPEUTIC ACTIVITIES: CPT

## 2024-05-13 NOTE — PROGRESS NOTES
PHYSICAL / OCCUPATIONAL THERAPY - DAILY TREATMENT NOTE (updated )    Patient Name: Denny Downey    Date: 2024    : 1970  Insurance: Payor:  MEDICAID / Plan: [facility]ARDINAL C[facility] / Product Type: *No Product type* /      Patient  verified Yes     Visit #   Current / Total 6 10   Time   In / Out 9:40 10:23   Pain   In / Out 0 0   Subjective Functional Status/Changes: Pt reports no falls since the last session.     TREATMENT AREA =  Other abnormalities of gait and mobility [R26.89]    OBJECTIVE  Therapeutic Procedures:  Tx Min  45 Billable or 1:1 Min (if diff from Tx Min)  43   Procedure, Rationale, Specifics   12  47949 Self Care/Home Management (timed):  improve patient knowledge and understanding of pain reducing techniques, positioning, posture/ergonomics, home safety, activity modification, diagnosis/prognosis, and physical therapy expectations, procedures and progression  to improve patient's ability to progress to PLOF and address remaining functional goals.  (see flow sheet as applicable)     Details if applicable: HEP update     20  13369 Neuromuscular Re-Education (timed):  improve balance, coordination, kinesthetic sense, posture, core stability and proprioception to improve patient's ability to develop conscious control of individual muscles and awareness of position of extremities in order to progress to PLOF and address remaining functional goals. (see flow sheet as applicable)     Details if applicable:       11  99545 Therapeutic Activity (timed):  use of dynamic activities replicating functional movements to increase ROM, strength, coordination, balance, and proprioception in order to improve patient's ability to progress to PLOF and address remaining functional goals.  (see flow sheet as applicable)     Details if applicable:      43  [facility] Totals Reminder: bill using total billable min of TIMED therapeutic procedures (example: do not include dry needle or

## 2024-06-03 ENCOUNTER — HOSPITAL ENCOUNTER (OUTPATIENT)
Facility: HOSPITAL | Age: 54
Setting detail: RECURRING SERIES
Discharge: HOME | End: 2024-06-06
Payer: MEDICAID

## 2024-06-03 PROCEDURE — 97112 NEUROMUSCULAR REEDUCATION: CPT

## 2024-06-03 PROCEDURE — 97110 THERAPEUTIC EXERCISES: CPT

## 2024-06-03 PROCEDURE — 97530 THERAPEUTIC ACTIVITIES: CPT

## 2024-06-03 NOTE — PROGRESS NOTES
[facility] AT [place]   [address] [place] [address]  Phone: [phone] Fax: [phone]  PROGRESS NOTE  Patient Name: Denny Downey : 1970   Treatment/Medical Diagnosis: Other abnormalities of gait and mobility [R26.89]   Referral Source: Javi Iniguez MD Payor: Payor: [facility] MEDICAID / Plan: [facility] CARDINAL CARE / Product Type: *No Product type* /    Date of Initial Visit: 3/21/24 Attended Visits: 7 Missed Visits: 4     SUMMARY OF TREATMENT  Denny Downey is a 53 y.o.  yo male with Dx of Other abnormalities of gait and mobility [R26.89].  Treatment has consisted of therapeutic exercise, therapeutic activity, neuromuscular re-education, gait training, self care education and physical agent/modality to improve impaired gait and balance due to CVA.    CURRENT STATUS  Patient has attended 7 of 11 scheduled sessions from 3/21/24-6/3/24 with limited progress toward goals. Current assessment indicates persistent fall risk and limitations in gait, transfers and left LE functional mobility. Planning to establish maintenance HEP over next 3-6 sessions to facilitate understanding and independence with home program to decrease fall risk and promote maintenance of functional level. Planning to DC to HEP in 3 weeks due to plateau in progress. Patient would benefit from continued skilled PT services to further improve functional deficits and facilitate return to prior level of function.    Subjective:  Pain: 0/10  Reported Functional Deficits: AM stiffness  Reported Functional Improvements: walking >15 minutes  Patient Goal: \"Be consistent in doing my exercises at home.\"    Objective:    Strength:  Hip strength: flexion (R) 4+/5, (L) 4/5  Knee strength: flexion (R) 4+/5, (L) 3+/5; extension (R) 5/5, (L) 4+/5  Ankle strength: (R) 5/5, (L) <3/5     5 X Sit to Stand: 16 seconds from 19 inch chair without UE assist, leans to right    TUG:  Home

## 2024-06-03 NOTE — PROGRESS NOTES
PHYSICAL / OCCUPATIONAL THERAPY - DAILY TREATMENT NOTE (updated )    Patient Name: Denny Downey    Date: 6/3/2024    : 1970  [facility] / Plan: [facility] CARDINAL CARE / Product Type: *No Product type* /      Patient  verified Yes     Visit #   Current / Total 4 10   Time   In / Out 10:20 11:00   Pain   In / Out 0 0   Subjective Functional Status/Changes: Pain: 0/10  Reported Functional Deficits: AM stiffness  Reported Functional Improvements: walking >15 minutes  Patient Goal: \"Be consistent in doing my exercises at home.\"     TREATMENT AREA =  Other abnormalities of gait and mobility [R26.89]    OBJECTIVE  Therapeutic Procedures:  Tx Min        Procedure, Rationale, Specifics   20  91599 Therapeutic Exercise (timed):  increase ROM, strength, coordination, balance, and proprioception to improve patient's ability to progress to PLOF and address remaining functional goals. (see flow sheet as applicable)    Details if applicable: LE strengthening; reassessment     10  08817 Neuromuscular Re-Education (timed):  improve balance, coordination, kinesthetic sense, posture, core stability and proprioception to improve patient's ability to develop conscious control of individual muscles and awareness of position of extremities in order to progress to PLOF and address remaining functional goals. (see flow sheet as applicable)     Details if applicable:  Left LE motor control re-ed; reassessment      10  85031 Therapeutic Activity (timed):  use of dynamic activities replicating functional movements to increase ROM, strength, coordination, balance, and proprioception in order to improve patient's ability to progress to PLOF and address remaining functional goals.  (see flow sheet as applicable)     Details if applicable:  bridge, STS, step taps; reassessment     40  [facility] BC Totals Reminder: bill using total billable min of TIMED therapeutic procedures (example: do not include

## 2024-06-07 ENCOUNTER — APPOINTMENT (OUTPATIENT)
Facility: HOSPITAL | Age: 54
End: 2024-06-07
Payer: MEDICAID

## 2024-06-10 ENCOUNTER — HOSPITAL ENCOUNTER (OUTPATIENT)
Facility: HOSPITAL | Age: 54
Setting detail: RECURRING SERIES
Discharge: HOME | End: 2024-06-13
Payer: MEDICAID

## 2024-06-10 PROCEDURE — 97112 NEUROMUSCULAR REEDUCATION: CPT

## 2024-06-10 PROCEDURE — 97110 THERAPEUTIC EXERCISES: CPT

## 2024-06-10 PROCEDURE — 97530 THERAPEUTIC ACTIVITIES: CPT

## 2024-06-10 NOTE — PROGRESS NOTES
PHYSICAL / OCCUPATIONAL THERAPY - DAILY TREATMENT NOTE (updated )    Patient Name: Denny Downey    Date: 6/10/2024    : 1970  [facility] / Plan: [facility] CARDINAL CARE / Product Type: *No Product type* /      Patient  verified Yes     Visit #   Current / Total 1 10   Time   In / Out 11:00 am 11:40 am   Pain   In / Out 0/10 010   Subjective Functional Status/Changes: \"I try to go for a walk every day.\"     TREATMENT AREA =  Other abnormalities of gait and mobility [R26.89]    OBJECTIVE  Therapeutic Procedures:  Tx Min   Procedure, Rationale, Specifics   11 43741 Therapeutic Exercise (timed):  increase ROM, strength, coordination, balance, and proprioception to improve patient's ability to progress to PLOF and address remaining functional goals. (see flow sheet as applicable)    Details if applicable: LE strengthening     18 26821 Neuromuscular Re-Education (timed):  improve balance, coordination, kinesthetic sense, posture, core stability and proprioception to improve patient's ability to develop conscious control of individual muscles and awareness of position of extremities in order to progress to PLOF and address remaining functional goals. (see flow sheet as applicable)     Details if applicable:  Left LE motor control re-ed     11 05970 Therapeutic Activity (timed):  use of dynamic activities replicating functional movements to increase ROM, strength, coordination, balance, and proprioception in order to improve patient's ability to progress to PLOF and address remaining functional goals.  (see flow sheet as applicable)     Details if applicable:  bridge, STS, step taps     40 MC BC Totals Reminder: bill using total billable min of TIMED therapeutic procedures (example: do not include dry needle or estim unattended, both untimed codes, in totals to left)  8-22 min = 1 unit; 23-37 min = 2 units; 38-52 min = 3 units; 53-67 min = 4 units; 68-82 min = 5 units

## 2024-06-14 ENCOUNTER — HOSPITAL ENCOUNTER (OUTPATIENT)
Facility: HOSPITAL | Age: 54
Setting detail: RECURRING SERIES
Discharge: HOME | End: 2024-06-17
Payer: MEDICAID

## 2024-06-14 PROCEDURE — 97116 GAIT TRAINING THERAPY: CPT

## 2024-06-14 PROCEDURE — 97112 NEUROMUSCULAR REEDUCATION: CPT

## 2024-06-14 PROCEDURE — 97530 THERAPEUTIC ACTIVITIES: CPT

## 2024-06-14 PROCEDURE — 97110 THERAPEUTIC EXERCISES: CPT

## 2024-06-14 NOTE — PROGRESS NOTES
PHYSICAL / OCCUPATIONAL THERAPY - DAILY TREATMENT NOTE (updated )    Patient Name: Denny Downye    Date: 2024    : 1970  [facility] / Plan: [facility] CARDINAL CARE / Product Type: *No Product type* /      Patient  verified Yes     Visit #   Current / Total 2 6   Time   In / Out 2:20 pm 2:59 pm   Pain   In / Out 0/10 0/10   Subjective Functional Status/Changes: \"I try not to fall. I haven't fallen this time.\"     TREATMENT AREA =  Other abnormalities of gait and mobility [R26.89]    OBJECTIVE  Therapeutic Procedures:  Tx Min   Procedure, Rationale, Specifics   9 16525 Therapeutic Exercise (timed):  increase ROM, strength, coordination, balance, and proprioception to improve patient's ability to progress to PLOF and address remaining functional goals. (see flow sheet as applicable)    Details if applicable: LE strengthening     9 23615 Neuromuscular Re-Education (timed):  improve balance, coordination, kinesthetic sense, posture, core stability and proprioception to improve patient's ability to develop conscious control of individual muscles and awareness of position of extremities in order to progress to PLOF and address remaining functional goals. (see flow sheet as applicable)     Details if applicable:  Left LE motor control re-ed, dynamic balance     8 89598 Gait Training (timed):    2 x 40 feet with right SPC (assistive device) over level surfaces with CGA and one instance of mod (A) level of assist. Cuing for increased left foot clearance.  To improve safety and dynamic movement with household/community ambulation.  (see flow sheet as applicable)     Retrograde walk  Sidestepping walk  March walk     13 [id number] Therapeutic Activity (timed):  use of dynamic activities replicating functional movements to increase ROM, strength, coordination, balance, and proprioception in order to improve patient's ability to progress to PLOF and address remaining functional

## 2024-06-17 ENCOUNTER — HOSPITAL ENCOUNTER (OUTPATIENT)
Facility: HOSPITAL | Age: 54
Setting detail: RECURRING SERIES
End: 2024-06-17
Payer: MEDICAID

## 2024-06-21 ENCOUNTER — HOSPITAL ENCOUNTER (OUTPATIENT)
Facility: HOSPITAL | Age: 54
Setting detail: RECURRING SERIES
Discharge: HOME | End: 2024-06-24
Payer: MEDICAID

## 2024-06-21 PROCEDURE — 97112 NEUROMUSCULAR REEDUCATION: CPT

## 2024-06-21 PROCEDURE — 97110 THERAPEUTIC EXERCISES: CPT

## 2024-06-21 PROCEDURE — 97530 THERAPEUTIC ACTIVITIES: CPT

## 2024-06-21 NOTE — PROGRESS NOTES
PHYSICAL / OCCUPATIONAL THERAPY - DAILY TREATMENT NOTE (updated )    Patient Name: Denny Downey    Date: 2024    : 1970  [facility] / Plan: [facility] CARDINAL CARE / Product Type: *No Product type* /      Patient  verified Yes     Visit #   Current / Total 3 6   Time   In / Out 2:21 pm 3:06 pm   Pain   In / Out 0/10 0/10   Subjective Functional Status/Changes:  Patient reports PT has been beneficial.     TREATMENT AREA =  Other abnormalities of gait and mobility [R26.89]    OBJECTIVE  Therapeutic Procedures:  Tx Min   Procedure, Rationale, Specifics   15 14697 Therapeutic Exercise (timed):  increase ROM, strength, coordination, balance, and proprioception to improve patient's ability to progress to PLOF and address remaining functional goals. (see flow sheet as applicable)    Details if applicable: LE strengthening; reassessment     16 15460 Neuromuscular Re-Education (timed):  improve balance, coordination, kinesthetic sense, posture, core stability and proprioception to improve patient's ability to develop conscious control of individual muscles and awareness of position of extremities in order to progress to PLOF and address remaining functional goals. (see flow sheet as applicable)     Details if applicable:  Left LE motor control re-ed, dynamic balance     14 52227 Therapeutic Activity (timed):  use of dynamic activities replicating functional movements to increase ROM, strength, coordination, balance, and proprioception in order to improve patient's ability to progress to PLOF and address remaining functional goals.  (see flow sheet as applicable)     Details if applicable:  bridge, STS, swissball taps (ball moved into hip flexion, abduction, adduction, and extension positions)     45 MC BC Totals Reminder: bill using total billable min of TIMED therapeutic procedures (example: do not include dry needle or estim unattended, both untimed codes, in totals

## 2024-06-21 NOTE — PROGRESS NOTES
Physical Therapy Discharge Instructions      [facility]   [address] [address] [phone] fax      Patient: Denny Downey  : 1970      Continue Home Exercise Program 1 times per day:      Continue with    [x] Ice  as needed     [] Heat           Follow up with MD:     [] Upon completion of therapy     [x] As needed      Recommendations:     [x]   Return to activity with home program    []   Return to activity with the following modifications:       []Post Rehab Program    []Join Independent aquatic program     []Return to/join local gym            Adarsh Glover PTA 2024 3:04 PM

## 2024-06-27 NOTE — PROGRESS NOTES
[facility] - [facility] AT [place] [id number] [facility], [place] [address]  Phone: [phone] Fax [phone]  DISCHARGE SUMMARY  Patient Name: Denny Downey : 1970   Treatment/Medical Diagnosis: Other abnormalities of gait and mobility [R26.89]   Referral Source: Javi Iniguez MD     Date of Initial Visit: 3/21/24 Attended Visits: 10 Missed Visits: 5     SUMMARY OF TREATMENT  Denny Downey is a 53 y.o.  yo male with Dx of Other abnormalities of gait and mobility [R26.89].  Treatment has consisted of therapeutic exercise, therapeutic activity, neuromuscular re-education, gait training, self care education and physical agent/modality to improve impaired gait and balance due to CVA.     CURRENT STATUS  The patient has attended 10 out of 15 sessions from 3/21/24-24, making great progress at this time. Patient demonstrates an improvement in TUG time from 24 seconds at last assessment to 17 seconds during today's session, indicating a reduction in fall risk when in the home. He notes no true falls since last assessment, reporting he has felt safer when negotiating stairs. Assessment as follows:    Subjective:  Pain: 0/10  Reported Functional Deficits: AM stiffness     Reported Functional Improvements: increased walking tolerance, transfers (I), increased confidence with negotiating stairs, able to walk a little faster when required without an increase in fear of falling     Objective:     Strength:  Hip strength: flexion (R) 4+/5, (L) 4/5  Knee strength: flexion (R) 5/5, (L) 3+/5; extension (R) 5/5, (L) 4+/5  Ankle strength: (R) 5/5, (L) <3/5     5 X Sit to Stand: 15 seconds from 19 inch chair without UE assist, leans to right     TU seconds with SBQC, difficulty most evident with turning     Gait: SBQC use with community ambulation; noted gait abnormalities include increased left LE tone, decreased left LE motor control, right lateral trunk lean,